# Patient Record
Sex: MALE | Race: WHITE | Employment: OTHER | URBAN - METROPOLITAN AREA
[De-identification: names, ages, dates, MRNs, and addresses within clinical notes are randomized per-mention and may not be internally consistent; named-entity substitution may affect disease eponyms.]

---

## 2017-12-18 ENCOUNTER — HOSPITAL ENCOUNTER (OUTPATIENT)
Dept: GENERAL RADIOLOGY | Age: 57
Discharge: HOME | End: 2017-12-18
Payer: COMMERCIAL

## 2017-12-18 ENCOUNTER — HOSPITAL ENCOUNTER (OUTPATIENT)
Dept: PREADMISSION TESTING | Age: 57
Discharge: HOME | End: 2017-12-18
Payer: COMMERCIAL

## 2017-12-18 DIAGNOSIS — Z01.818 PREOP TESTING: ICD-10-CM

## 2017-12-18 LAB
ABO + RH BLD: NORMAL
ALBUMIN SERPL-MCNC: 3.4 G/DL (ref 3.4–5)
ALBUMIN/GLOB SERPL: 1.2 {RATIO} (ref 0.8–1.7)
ALP SERPL-CCNC: 83 U/L (ref 45–117)
ALT SERPL-CCNC: 29 U/L (ref 16–61)
ANION GAP SERPL CALC-SCNC: 9 MMOL/L (ref 3–18)
APPEARANCE UR: CLEAR
APTT PPP: 25.7 SEC (ref 23–36.4)
AST SERPL-CCNC: 21 U/L (ref 15–37)
ATRIAL RATE: 77 BPM
BASOPHILS # BLD: 0 K/UL (ref 0–0.06)
BASOPHILS NFR BLD: 0 % (ref 0–2)
BILIRUB SERPL-MCNC: 0.4 MG/DL (ref 0.2–1)
BILIRUB UR QL: NEGATIVE
BLOOD GROUP ANTIBODIES SERPL: NORMAL
BUN SERPL-MCNC: 7 MG/DL (ref 7–18)
BUN/CREAT SERPL: 7 (ref 12–20)
CALCIUM SERPL-MCNC: 8.3 MG/DL (ref 8.5–10.1)
CALCULATED P AXIS, ECG09: 64 DEGREES
CALCULATED R AXIS, ECG10: 51 DEGREES
CALCULATED T AXIS, ECG11: 49 DEGREES
CHLORIDE SERPL-SCNC: 106 MMOL/L (ref 100–108)
CO2 SERPL-SCNC: 27 MMOL/L (ref 21–32)
COLOR UR: YELLOW
CREAT SERPL-MCNC: 0.95 MG/DL (ref 0.6–1.3)
DIAGNOSIS, 93000: NORMAL
DIFFERENTIAL METHOD BLD: ABNORMAL
EOSINOPHIL # BLD: 0 K/UL (ref 0–0.4)
EOSINOPHIL NFR BLD: 0 % (ref 0–5)
ERYTHROCYTE [DISTWIDTH] IN BLOOD BY AUTOMATED COUNT: 13.2 % (ref 11.6–14.5)
GLOBULIN SER CALC-MCNC: 2.9 G/DL (ref 2–4)
GLUCOSE SERPL-MCNC: 98 MG/DL (ref 74–99)
GLUCOSE UR STRIP.AUTO-MCNC: NEGATIVE MG/DL
HCT VFR BLD AUTO: 44.3 % (ref 36–48)
HGB BLD-MCNC: 14.2 G/DL (ref 13–16)
HGB UR QL STRIP: NEGATIVE
INR PPP: 0.9 (ref 0.8–1.2)
KETONES UR QL STRIP.AUTO: NEGATIVE MG/DL
LEUKOCYTE ESTERASE UR QL STRIP.AUTO: NEGATIVE
LYMPHOCYTES # BLD: 1.1 K/UL (ref 0.9–3.6)
LYMPHOCYTES NFR BLD: 12 % (ref 21–52)
MCH RBC QN AUTO: 30.1 PG (ref 24–34)
MCHC RBC AUTO-ENTMCNC: 32.1 G/DL (ref 31–37)
MCV RBC AUTO: 93.9 FL (ref 74–97)
MONOCYTES # BLD: 0.6 K/UL (ref 0.05–1.2)
MONOCYTES NFR BLD: 6 % (ref 3–10)
NEUTS SEG # BLD: 7.7 K/UL (ref 1.8–8)
NEUTS SEG NFR BLD: 82 % (ref 40–73)
NITRITE UR QL STRIP.AUTO: NEGATIVE
P-R INTERVAL, ECG05: 150 MS
PH UR STRIP: 7 [PH] (ref 5–8)
PLATELET # BLD AUTO: 207 K/UL (ref 135–420)
PMV BLD AUTO: 9.2 FL (ref 9.2–11.8)
POTASSIUM SERPL-SCNC: 3.8 MMOL/L (ref 3.5–5.5)
PROT SERPL-MCNC: 6.3 G/DL (ref 6.4–8.2)
PROT UR STRIP-MCNC: NEGATIVE MG/DL
PROTHROMBIN TIME: 12 SEC (ref 11.5–15.2)
Q-T INTERVAL, ECG07: 346 MS
QRS DURATION, ECG06: 90 MS
QTC CALCULATION (BEZET), ECG08: 391 MS
RBC # BLD AUTO: 4.72 M/UL (ref 4.7–5.5)
SODIUM SERPL-SCNC: 142 MMOL/L (ref 136–145)
SP GR UR REFRACTOMETRY: 1.02 (ref 1–1.03)
SPECIMEN EXP DATE BLD: NORMAL
UROBILINOGEN UR QL STRIP.AUTO: 0.2 EU/DL (ref 0.2–1)
VENTRICULAR RATE, ECG03: 77 BPM
WBC # BLD AUTO: 9.5 K/UL (ref 4.6–13.2)

## 2017-12-18 PROCEDURE — 87086 URINE CULTURE/COLONY COUNT: CPT | Performed by: ORTHOPAEDIC SURGERY

## 2017-12-18 PROCEDURE — 71020 XR CHEST PA LAT: CPT

## 2017-12-18 PROCEDURE — 80053 COMPREHEN METABOLIC PANEL: CPT | Performed by: ORTHOPAEDIC SURGERY

## 2017-12-18 PROCEDURE — 36415 COLL VENOUS BLD VENIPUNCTURE: CPT | Performed by: ORTHOPAEDIC SURGERY

## 2017-12-18 PROCEDURE — 81003 URINALYSIS AUTO W/O SCOPE: CPT | Performed by: ORTHOPAEDIC SURGERY

## 2017-12-18 PROCEDURE — 86900 BLOOD TYPING SEROLOGIC ABO: CPT | Performed by: ORTHOPAEDIC SURGERY

## 2017-12-18 PROCEDURE — 85610 PROTHROMBIN TIME: CPT | Performed by: ORTHOPAEDIC SURGERY

## 2017-12-18 PROCEDURE — 85730 THROMBOPLASTIN TIME PARTIAL: CPT | Performed by: ORTHOPAEDIC SURGERY

## 2017-12-18 PROCEDURE — 85025 COMPLETE CBC W/AUTO DIFF WBC: CPT | Performed by: ORTHOPAEDIC SURGERY

## 2017-12-18 PROCEDURE — 93005 ELECTROCARDIOGRAM TRACING: CPT

## 2017-12-18 RX ORDER — ROSUVASTATIN CALCIUM 20 MG/1
20 TABLET, COATED ORAL
COMMUNITY

## 2017-12-18 NOTE — PROGRESS NOTES
Yo Brianna attended the Joint Replacement Pre-Operative class on 12/18/17. Topics discussed included surgery preparation, what to expect the day of surgery, medications, physical and occupational therapy, and discharge planning. It was discussed that this is considered an elective surgery and that prior to the surgery they need to make decisions such as arranging for help at home once they are discharged. He was given a knee patient education notebook to take home. Opportunity was given to ask questions and phone number of the Orthopaedic Nurse Clinician was given for any questions or concerns that may arise later.

## 2017-12-19 LAB
BACTERIA SPEC CULT: NORMAL
SERVICE CMNT-IMP: NORMAL

## 2017-12-21 ENCOUNTER — ANESTHESIA EVENT (OUTPATIENT)
Dept: SURGERY | Age: 57
DRG: 470 | End: 2017-12-21
Payer: COMMERCIAL

## 2017-12-22 ENCOUNTER — APPOINTMENT (OUTPATIENT)
Dept: GENERAL RADIOLOGY | Age: 57
DRG: 470 | End: 2017-12-22
Attending: ORTHOPAEDIC SURGERY
Payer: COMMERCIAL

## 2017-12-22 ENCOUNTER — ANESTHESIA (OUTPATIENT)
Dept: SURGERY | Age: 57
DRG: 470 | End: 2017-12-22
Payer: COMMERCIAL

## 2017-12-22 ENCOUNTER — HOSPITAL ENCOUNTER (INPATIENT)
Age: 57
LOS: 2 days | Discharge: HOME HEALTH | DRG: 470 | End: 2017-12-24
Attending: ORTHOPAEDIC SURGERY | Admitting: ORTHOPAEDIC SURGERY
Payer: COMMERCIAL

## 2017-12-22 DIAGNOSIS — M17.32 POST-TRAUMATIC OSTEOARTHRITIS OF LEFT KNEE: Primary | ICD-10-CM

## 2017-12-22 PROBLEM — M17.9 KNEE OSTEOARTHRITIS: Status: ACTIVE | Noted: 2017-12-22

## 2017-12-22 LAB
ANION GAP SERPL CALC-SCNC: 7 MMOL/L (ref 3–18)
BUN SERPL-MCNC: 8 MG/DL (ref 7–18)
BUN/CREAT SERPL: 8 (ref 12–20)
CALCIUM SERPL-MCNC: 8.1 MG/DL (ref 8.5–10.1)
CHLORIDE SERPL-SCNC: 109 MMOL/L (ref 100–108)
CO2 SERPL-SCNC: 27 MMOL/L (ref 21–32)
CREAT SERPL-MCNC: 1.04 MG/DL (ref 0.6–1.3)
GLUCOSE SERPL-MCNC: 94 MG/DL (ref 74–99)
POTASSIUM SERPL-SCNC: 4.4 MMOL/L (ref 3.5–5.5)
SODIUM SERPL-SCNC: 143 MMOL/L (ref 136–145)

## 2017-12-22 PROCEDURE — 76942 ECHO GUIDE FOR BIOPSY: CPT | Performed by: ANESTHESIOLOGY

## 2017-12-22 PROCEDURE — 77030034479 HC ADH SKN CLSR PRINEO J&J -B: Performed by: ORTHOPAEDIC SURGERY

## 2017-12-22 PROCEDURE — 74011250636 HC RX REV CODE- 250/636

## 2017-12-22 PROCEDURE — 36415 COLL VENOUS BLD VENIPUNCTURE: CPT | Performed by: ORTHOPAEDIC SURGERY

## 2017-12-22 PROCEDURE — 77030026438 HC STYL ET INTUB CARD -A: Performed by: ANESTHESIOLOGY

## 2017-12-22 PROCEDURE — C1713 ANCHOR/SCREW BN/BN,TIS/BN: HCPCS | Performed by: ORTHOPAEDIC SURGERY

## 2017-12-22 PROCEDURE — 97110 THERAPEUTIC EXERCISES: CPT

## 2017-12-22 PROCEDURE — 73560 X-RAY EXAM OF KNEE 1 OR 2: CPT

## 2017-12-22 PROCEDURE — 77030013079 HC BLNKT BAIR HGGR 3M -A: Performed by: ORTHOPAEDIC SURGERY

## 2017-12-22 PROCEDURE — 74011000258 HC RX REV CODE- 258: Performed by: ORTHOPAEDIC SURGERY

## 2017-12-22 PROCEDURE — 3E0T3BZ INTRODUCTION OF ANESTHETIC AGENT INTO PERIPHERAL NERVES AND PLEXI, PERCUTANEOUS APPROACH: ICD-10-PCS | Performed by: ANESTHESIOLOGY

## 2017-12-22 PROCEDURE — C1776 JOINT DEVICE (IMPLANTABLE): HCPCS | Performed by: ORTHOPAEDIC SURGERY

## 2017-12-22 PROCEDURE — 97161 PT EVAL LOW COMPLEX 20 MIN: CPT

## 2017-12-22 PROCEDURE — 77030013079 HC BLNKT BAIR HGGR 3M -A: Performed by: ANESTHESIOLOGY

## 2017-12-22 PROCEDURE — 77030031139 HC SUT VCRL2 J&J -A: Performed by: ORTHOPAEDIC SURGERY

## 2017-12-22 PROCEDURE — 64450 NJX AA&/STRD OTHER PN/BRANCH: CPT | Performed by: ANESTHESIOLOGY

## 2017-12-22 PROCEDURE — 74011250637 HC RX REV CODE- 250/637: Performed by: ORTHOPAEDIC SURGERY

## 2017-12-22 PROCEDURE — 77030016544 HC BLD SAW RECIP1 STRY -B: Performed by: ORTHOPAEDIC SURGERY

## 2017-12-22 PROCEDURE — 74011250637 HC RX REV CODE- 250/637: Performed by: NURSE ANESTHETIST, CERTIFIED REGISTERED

## 2017-12-22 PROCEDURE — 77030018548 HC SUT ETHBND2 J&J -B: Performed by: ORTHOPAEDIC SURGERY

## 2017-12-22 PROCEDURE — 77030029494 HC CLD THER UNIT ICMN DJOR -B: Performed by: ORTHOPAEDIC SURGERY

## 2017-12-22 PROCEDURE — 77030020782 HC GWN BAIR PAWS FLX 3M -B: Performed by: ORTHOPAEDIC SURGERY

## 2017-12-22 PROCEDURE — 77030021370 HC WRP CLD THER ICMN DJOR -B: Performed by: ORTHOPAEDIC SURGERY

## 2017-12-22 PROCEDURE — 80048 BASIC METABOLIC PNL TOTAL CA: CPT | Performed by: ORTHOPAEDIC SURGERY

## 2017-12-22 PROCEDURE — 76060000035 HC ANESTHESIA 2 TO 2.5 HR: Performed by: ORTHOPAEDIC SURGERY

## 2017-12-22 PROCEDURE — 74011250636 HC RX REV CODE- 250/636: Performed by: NURSE ANESTHETIST, CERTIFIED REGISTERED

## 2017-12-22 PROCEDURE — 77030002933 HC SUT MCRYL J&J -A: Performed by: ORTHOPAEDIC SURGERY

## 2017-12-22 PROCEDURE — 77030012890: Performed by: ORTHOPAEDIC SURGERY

## 2017-12-22 PROCEDURE — 76210000006 HC OR PH I REC 0.5 TO 1 HR: Performed by: ORTHOPAEDIC SURGERY

## 2017-12-22 PROCEDURE — 76010000171 HC OR TIME 2 TO 2.5 HR INTENSV-TIER 1: Performed by: ORTHOPAEDIC SURGERY

## 2017-12-22 PROCEDURE — 0SRD0J9 REPLACEMENT OF LEFT KNEE JOINT WITH SYNTHETIC SUBSTITUTE, CEMENTED, OPEN APPROACH: ICD-10-PCS | Performed by: ORTHOPAEDIC SURGERY

## 2017-12-22 PROCEDURE — 77030003601 HC NDL NRV BLK BBMI -A: Performed by: ORTHOPAEDIC SURGERY

## 2017-12-22 PROCEDURE — 77030012406 HC DRN WND PENRS BARD -A: Performed by: ORTHOPAEDIC SURGERY

## 2017-12-22 PROCEDURE — 77030013708 HC HNDPC SUC IRR PULS STRY –B: Performed by: ORTHOPAEDIC SURGERY

## 2017-12-22 PROCEDURE — 74011250636 HC RX REV CODE- 250/636: Performed by: ORTHOPAEDIC SURGERY

## 2017-12-22 PROCEDURE — 77030011640 HC PAD GRND REM COVD -A: Performed by: ORTHOPAEDIC SURGERY

## 2017-12-22 PROCEDURE — 77030034850: Performed by: ORTHOPAEDIC SURGERY

## 2017-12-22 PROCEDURE — 77030019605: Performed by: ORTHOPAEDIC SURGERY

## 2017-12-22 PROCEDURE — 77030027138 HC INCENT SPIROMETER -A

## 2017-12-22 PROCEDURE — 65270000029 HC RM PRIVATE

## 2017-12-22 PROCEDURE — 77030008683 HC TU ET CUF COVD -A: Performed by: ANESTHESIOLOGY

## 2017-12-22 PROCEDURE — 77030032490 HC SLV COMPR SCD KNE COVD -B: Performed by: ORTHOPAEDIC SURGERY

## 2017-12-22 PROCEDURE — C9290 INJ, BUPIVACAINE LIPOSOME: HCPCS | Performed by: ORTHOPAEDIC SURGERY

## 2017-12-22 PROCEDURE — 74011000250 HC RX REV CODE- 250: Performed by: ORTHOPAEDIC SURGERY

## 2017-12-22 PROCEDURE — 97530 THERAPEUTIC ACTIVITIES: CPT

## 2017-12-22 PROCEDURE — 77030018836 HC SOL IRR NACL ICUM -A: Performed by: ORTHOPAEDIC SURGERY

## 2017-12-22 PROCEDURE — 74011000250 HC RX REV CODE- 250

## 2017-12-22 PROCEDURE — 77030012935 HC DRSG AQUACEL BMS -B: Performed by: ORTHOPAEDIC SURGERY

## 2017-12-22 PROCEDURE — 77030000032 HC CUF TRNQT ZIMM -B: Performed by: ORTHOPAEDIC SURGERY

## 2017-12-22 PROCEDURE — 77010033678 HC OXYGEN DAILY

## 2017-12-22 PROCEDURE — 77030025371 HC PIN FIX QD SIG J&J -D: Performed by: ORTHOPAEDIC SURGERY

## 2017-12-22 PROCEDURE — 77030010785: Performed by: ORTHOPAEDIC SURGERY

## 2017-12-22 PROCEDURE — 74011000258 HC RX REV CODE- 258

## 2017-12-22 PROCEDURE — 74011000258 HC RX REV CODE- 258: Performed by: NURSE ANESTHETIST, CERTIFIED REGISTERED

## 2017-12-22 DEVICE — IMPLANTABLE DEVICE: Type: IMPLANTABLE DEVICE | Site: KNEE | Status: FUNCTIONAL

## 2017-12-22 DEVICE — INSERT TIB RP FEM KNEE CEM: Type: IMPLANTABLE DEVICE | Site: KNEE | Status: FUNCTIONAL

## 2017-12-22 DEVICE — ATTUNE PATELLA MEDIALIZED ANATOMIC 41MM CEMENTED AOX
Type: IMPLANTABLE DEVICE | Site: KNEE | Status: FUNCTIONAL
Brand: ATTUNE

## 2017-12-22 DEVICE — ATTUNE KNEE SYSTEM FEMORAL POSTERIOR STABILIZED SIZE 8 LEFT CEMENTED
Type: IMPLANTABLE DEVICE | Site: KNEE | Status: FUNCTIONAL
Brand: ATTUNE

## 2017-12-22 DEVICE — ATTUNE KNEE SYSTEM TIBIAL BASE FIXED BEARING SIZE 7 CEMENTED
Type: IMPLANTABLE DEVICE | Site: KNEE | Status: FUNCTIONAL
Brand: ATTUNE

## 2017-12-22 DEVICE — SMARTSET GMV HIGH PERFORMANCE GENTAMICIN MEDIUM VISCOSITY BONE CEMENT 40G
Type: IMPLANTABLE DEVICE | Site: KNEE | Status: FUNCTIONAL
Brand: SMARTSET

## 2017-12-22 RX ORDER — NALOXONE HYDROCHLORIDE 0.4 MG/ML
0.4 INJECTION, SOLUTION INTRAMUSCULAR; INTRAVENOUS; SUBCUTANEOUS AS NEEDED
Status: DISCONTINUED | OUTPATIENT
Start: 2017-12-22 | End: 2017-12-24 | Stop reason: HOSPADM

## 2017-12-22 RX ORDER — OXYCODONE HYDROCHLORIDE 5 MG/1
5-15 TABLET ORAL
Status: DISCONTINUED | OUTPATIENT
Start: 2017-12-22 | End: 2017-12-24 | Stop reason: HOSPADM

## 2017-12-22 RX ORDER — CELECOXIB 100 MG/1
200 CAPSULE ORAL 2 TIMES DAILY
Status: DISCONTINUED | OUTPATIENT
Start: 2017-12-22 | End: 2017-12-24 | Stop reason: HOSPADM

## 2017-12-22 RX ORDER — ACETAMINOPHEN 500 MG
1000 TABLET ORAL EVERY 8 HOURS
Status: DISCONTINUED | OUTPATIENT
Start: 2017-12-22 | End: 2017-12-24 | Stop reason: HOSPADM

## 2017-12-22 RX ORDER — MIDAZOLAM HYDROCHLORIDE 1 MG/ML
INJECTION, SOLUTION INTRAMUSCULAR; INTRAVENOUS AS NEEDED
Status: DISCONTINUED | OUTPATIENT
Start: 2017-12-22 | End: 2017-12-22 | Stop reason: HOSPADM

## 2017-12-22 RX ORDER — SODIUM CHLORIDE 0.9 % (FLUSH) 0.9 %
5-10 SYRINGE (ML) INJECTION AS NEEDED
Status: DISCONTINUED | OUTPATIENT
Start: 2017-12-22 | End: 2017-12-24 | Stop reason: HOSPADM

## 2017-12-22 RX ORDER — LIDOCAINE HYDROCHLORIDE 20 MG/ML
INJECTION, SOLUTION EPIDURAL; INFILTRATION; INTRACAUDAL; PERINEURAL AS NEEDED
Status: DISCONTINUED | OUTPATIENT
Start: 2017-12-22 | End: 2017-12-22 | Stop reason: HOSPADM

## 2017-12-22 RX ORDER — DEXAMETHASONE SODIUM PHOSPHATE 4 MG/ML
INJECTION, SOLUTION INTRA-ARTICULAR; INTRALESIONAL; INTRAMUSCULAR; INTRAVENOUS; SOFT TISSUE AS NEEDED
Status: DISCONTINUED | OUTPATIENT
Start: 2017-12-22 | End: 2017-12-22 | Stop reason: HOSPADM

## 2017-12-22 RX ORDER — SODIUM CHLORIDE 9 MG/ML
125 INJECTION, SOLUTION INTRAVENOUS CONTINUOUS
Status: DISCONTINUED | OUTPATIENT
Start: 2017-12-22 | End: 2017-12-24 | Stop reason: HOSPADM

## 2017-12-22 RX ORDER — FAMOTIDINE 20 MG/1
20 TABLET, FILM COATED ORAL ONCE
Status: COMPLETED | OUTPATIENT
Start: 2017-12-22 | End: 2017-12-22

## 2017-12-22 RX ORDER — NEOSTIGMINE METHYLSULFATE 5 MG/5 ML
SYRINGE (ML) INTRAVENOUS AS NEEDED
Status: DISCONTINUED | OUTPATIENT
Start: 2017-12-22 | End: 2017-12-22 | Stop reason: HOSPADM

## 2017-12-22 RX ORDER — FENTANYL CITRATE 50 UG/ML
50 INJECTION, SOLUTION INTRAMUSCULAR; INTRAVENOUS AS NEEDED
Status: DISCONTINUED | OUTPATIENT
Start: 2017-12-22 | End: 2017-12-22 | Stop reason: HOSPADM

## 2017-12-22 RX ORDER — SODIUM CHLORIDE 0.9 % (FLUSH) 0.9 %
5-10 SYRINGE (ML) INJECTION AS NEEDED
Status: DISCONTINUED | OUTPATIENT
Start: 2017-12-22 | End: 2017-12-22 | Stop reason: HOSPADM

## 2017-12-22 RX ORDER — ROSUVASTATIN CALCIUM 20 MG/1
20 TABLET, COATED ORAL
Status: DISCONTINUED | OUTPATIENT
Start: 2017-12-22 | End: 2017-12-24 | Stop reason: HOSPADM

## 2017-12-22 RX ORDER — ROPIVACAINE HYDROCHLORIDE 2 MG/ML
60 INJECTION, SOLUTION EPIDURAL; INFILTRATION; PERINEURAL
Status: COMPLETED | OUTPATIENT
Start: 2017-12-22 | End: 2017-12-22

## 2017-12-22 RX ORDER — MIDAZOLAM HYDROCHLORIDE 1 MG/ML
2 INJECTION, SOLUTION INTRAMUSCULAR; INTRAVENOUS ONCE
Status: COMPLETED | OUTPATIENT
Start: 2017-12-22 | End: 2017-12-22

## 2017-12-22 RX ORDER — FACIAL-BODY WIPES
10 EACH TOPICAL DAILY PRN
Status: DISCONTINUED | OUTPATIENT
Start: 2017-12-22 | End: 2017-12-24 | Stop reason: HOSPADM

## 2017-12-22 RX ORDER — SODIUM CHLORIDE, SODIUM LACTATE, POTASSIUM CHLORIDE, CALCIUM CHLORIDE 600; 310; 30; 20 MG/100ML; MG/100ML; MG/100ML; MG/100ML
75 INJECTION, SOLUTION INTRAVENOUS CONTINUOUS
Status: DISCONTINUED | OUTPATIENT
Start: 2017-12-22 | End: 2017-12-22 | Stop reason: HOSPADM

## 2017-12-22 RX ORDER — GLYCOPYRROLATE 0.2 MG/ML
INJECTION INTRAMUSCULAR; INTRAVENOUS AS NEEDED
Status: DISCONTINUED | OUTPATIENT
Start: 2017-12-22 | End: 2017-12-22 | Stop reason: HOSPADM

## 2017-12-22 RX ORDER — FENTANYL CITRATE 50 UG/ML
INJECTION, SOLUTION INTRAMUSCULAR; INTRAVENOUS AS NEEDED
Status: DISCONTINUED | OUTPATIENT
Start: 2017-12-22 | End: 2017-12-22 | Stop reason: HOSPADM

## 2017-12-22 RX ORDER — HYDROMORPHONE HYDROCHLORIDE 1 MG/ML
INJECTION, SOLUTION INTRAMUSCULAR; INTRAVENOUS; SUBCUTANEOUS AS NEEDED
Status: DISCONTINUED | OUTPATIENT
Start: 2017-12-22 | End: 2017-12-22 | Stop reason: HOSPADM

## 2017-12-22 RX ORDER — ROCURONIUM BROMIDE 10 MG/ML
INJECTION, SOLUTION INTRAVENOUS AS NEEDED
Status: DISCONTINUED | OUTPATIENT
Start: 2017-12-22 | End: 2017-12-22 | Stop reason: HOSPADM

## 2017-12-22 RX ORDER — POLYETHYLENE GLYCOL 3350 17 G/17G
17 POWDER, FOR SOLUTION ORAL DAILY
Status: DISCONTINUED | OUTPATIENT
Start: 2017-12-23 | End: 2017-12-24 | Stop reason: HOSPADM

## 2017-12-22 RX ORDER — SODIUM CHLORIDE 0.9 % (FLUSH) 0.9 %
5-10 SYRINGE (ML) INJECTION EVERY 8 HOURS
Status: DISCONTINUED | OUTPATIENT
Start: 2017-12-22 | End: 2017-12-22 | Stop reason: HOSPADM

## 2017-12-22 RX ORDER — PROPOFOL 10 MG/ML
INJECTION, EMULSION INTRAVENOUS AS NEEDED
Status: DISCONTINUED | OUTPATIENT
Start: 2017-12-22 | End: 2017-12-22 | Stop reason: HOSPADM

## 2017-12-22 RX ORDER — NALOXONE HYDROCHLORIDE 0.4 MG/ML
0.1 INJECTION, SOLUTION INTRAMUSCULAR; INTRAVENOUS; SUBCUTANEOUS ONCE
Status: DISCONTINUED | OUTPATIENT
Start: 2017-12-22 | End: 2017-12-22 | Stop reason: HOSPADM

## 2017-12-22 RX ORDER — SODIUM CHLORIDE 0.9 % (FLUSH) 0.9 %
5-10 SYRINGE (ML) INJECTION EVERY 8 HOURS
Status: DISCONTINUED | OUTPATIENT
Start: 2017-12-22 | End: 2017-12-24 | Stop reason: HOSPADM

## 2017-12-22 RX ORDER — DIPHENHYDRAMINE HCL 25 MG
25 CAPSULE ORAL
Status: DISCONTINUED | OUTPATIENT
Start: 2017-12-22 | End: 2017-12-24 | Stop reason: HOSPADM

## 2017-12-22 RX ORDER — FENTANYL CITRATE 50 UG/ML
100 INJECTION, SOLUTION INTRAMUSCULAR; INTRAVENOUS ONCE
Status: COMPLETED | OUTPATIENT
Start: 2017-12-22 | End: 2017-12-22

## 2017-12-22 RX ORDER — SUCCINYLCHOLINE CHLORIDE 20 MG/ML
INJECTION INTRAMUSCULAR; INTRAVENOUS AS NEEDED
Status: DISCONTINUED | OUTPATIENT
Start: 2017-12-22 | End: 2017-12-22 | Stop reason: HOSPADM

## 2017-12-22 RX ORDER — ONDANSETRON 2 MG/ML
4-8 INJECTION INTRAMUSCULAR; INTRAVENOUS
Status: DISCONTINUED | OUTPATIENT
Start: 2017-12-22 | End: 2017-12-24 | Stop reason: HOSPADM

## 2017-12-22 RX ADMIN — ROPIVACAINE HYDROCHLORIDE 60 MG: 2 INJECTION, SOLUTION EPIDURAL; INFILTRATION at 09:48

## 2017-12-22 RX ADMIN — Medication 10 ML: at 21:47

## 2017-12-22 RX ADMIN — DEXAMETHASONE SODIUM PHOSPHATE 4 MG: 4 INJECTION, SOLUTION INTRA-ARTICULAR; INTRALESIONAL; INTRAMUSCULAR; INTRAVENOUS; SOFT TISSUE at 10:28

## 2017-12-22 RX ADMIN — SODIUM CHLORIDE 1000 MG: 900 INJECTION, SOLUTION INTRAVENOUS at 09:18

## 2017-12-22 RX ADMIN — MIDAZOLAM HYDROCHLORIDE 2 MG: 1 INJECTION, SOLUTION INTRAMUSCULAR; INTRAVENOUS at 09:42

## 2017-12-22 RX ADMIN — OXYCODONE HYDROCHLORIDE 5 MG: 5 TABLET ORAL at 17:14

## 2017-12-22 RX ADMIN — PROPOFOL 180 MG: 10 INJECTION, EMULSION INTRAVENOUS at 10:17

## 2017-12-22 RX ADMIN — FENTANYL CITRATE 100 MCG: 50 INJECTION INTRAMUSCULAR; INTRAVENOUS at 09:42

## 2017-12-22 RX ADMIN — CELECOXIB 200 MG: 100 CAPSULE ORAL at 17:14

## 2017-12-22 RX ADMIN — SODIUM CHLORIDE, SODIUM LACTATE, POTASSIUM CHLORIDE, AND CALCIUM CHLORIDE 75 ML/HR: 600; 310; 30; 20 INJECTION, SOLUTION INTRAVENOUS at 09:09

## 2017-12-22 RX ADMIN — GLYCOPYRROLATE 0.6 MG: 0.2 INJECTION INTRAMUSCULAR; INTRAVENOUS at 12:29

## 2017-12-22 RX ADMIN — SODIUM CHLORIDE 125 ML/HR: 900 INJECTION, SOLUTION INTRAVENOUS at 21:53

## 2017-12-22 RX ADMIN — TRANEXAMIC ACID 1 G: 100 INJECTION, SOLUTION INTRAVENOUS at 10:19

## 2017-12-22 RX ADMIN — RIVAROXABAN 10 MG: 10 TABLET, FILM COATED ORAL at 17:14

## 2017-12-22 RX ADMIN — HYDROMORPHONE HYDROCHLORIDE 0.5 MG: 1 INJECTION, SOLUTION INTRAMUSCULAR; INTRAVENOUS; SUBCUTANEOUS at 12:27

## 2017-12-22 RX ADMIN — SODIUM CHLORIDE 1 G: 900 INJECTION, SOLUTION INTRAVENOUS at 09:20

## 2017-12-22 RX ADMIN — Medication 10 ML: at 15:02

## 2017-12-22 RX ADMIN — TRANEXAMIC ACID 1 G: 100 INJECTION, SOLUTION INTRAVENOUS at 11:55

## 2017-12-22 RX ADMIN — ROCURONIUM BROMIDE 35 MG: 10 INJECTION, SOLUTION INTRAVENOUS at 10:30

## 2017-12-22 RX ADMIN — ROCURONIUM BROMIDE 5 MG: 10 INJECTION, SOLUTION INTRAVENOUS at 10:17

## 2017-12-22 RX ADMIN — OXYCODONE HYDROCHLORIDE 5 MG: 5 TABLET ORAL at 21:51

## 2017-12-22 RX ADMIN — ACETAMINOPHEN 1000 MG: 500 TABLET ORAL at 21:47

## 2017-12-22 RX ADMIN — SUCCINYLCHOLINE CHLORIDE 120 MG: 20 INJECTION INTRAMUSCULAR; INTRAVENOUS at 10:17

## 2017-12-22 RX ADMIN — Medication 3 MG: at 12:29

## 2017-12-22 RX ADMIN — FAMOTIDINE 20 MG: 20 TABLET, FILM COATED ORAL at 09:09

## 2017-12-22 RX ADMIN — PROMETHAZINE HYDROCHLORIDE 12.5 MG: 25 INJECTION INTRAMUSCULAR; INTRAVENOUS at 13:03

## 2017-12-22 RX ADMIN — PROPOFOL 20 MG: 10 INJECTION, EMULSION INTRAVENOUS at 11:15

## 2017-12-22 RX ADMIN — SODIUM CHLORIDE 900 MG: 900 INJECTION, SOLUTION INTRAVENOUS at 17:14

## 2017-12-22 RX ADMIN — HYDROMORPHONE HYDROCHLORIDE 0.5 MG: 1 INJECTION, SOLUTION INTRAMUSCULAR; INTRAVENOUS; SUBCUTANEOUS at 12:02

## 2017-12-22 RX ADMIN — GLYCOPYRROLATE 0.1 MG: 0.2 INJECTION INTRAMUSCULAR; INTRAVENOUS at 10:10

## 2017-12-22 RX ADMIN — ACETAMINOPHEN 1000 MG: 500 TABLET ORAL at 15:02

## 2017-12-22 RX ADMIN — LIDOCAINE HYDROCHLORIDE 40 MG: 20 INJECTION, SOLUTION EPIDURAL; INFILTRATION; INTRACAUDAL; PERINEURAL at 10:17

## 2017-12-22 RX ADMIN — SODIUM CHLORIDE 125 ML/HR: 900 INJECTION, SOLUTION INTRAVENOUS at 14:44

## 2017-12-22 RX ADMIN — SODIUM CHLORIDE, SODIUM LACTATE, POTASSIUM CHLORIDE, AND CALCIUM CHLORIDE: 600; 310; 30; 20 INJECTION, SOLUTION INTRAVENOUS at 11:45

## 2017-12-22 RX ADMIN — MIDAZOLAM HYDROCHLORIDE 1 MG: 1 INJECTION, SOLUTION INTRAMUSCULAR; INTRAVENOUS at 10:10

## 2017-12-22 RX ADMIN — FENTANYL CITRATE 100 MCG: 50 INJECTION, SOLUTION INTRAMUSCULAR; INTRAVENOUS at 10:17

## 2017-12-22 NOTE — PROGRESS NOTES
Problem: Mobility Impaired (Adult and Pediatric)  Goal: *Acute Goals and Plan of Care (Insert Text)  Physical Therapy Goals  Initiated 12/22/2017 and to be accomplished within 7 day(s)  1. Patient will move from supine to sit and sit to supine  in bed with modified independence. 2.  Patient will transfer from bed to chair and chair to bed with modified independence using the least restrictive device. 3.  Patient will perform sit to stand with modified independence. 4.  Patient will ambulate with supervision/set-up for 300 feet with the least restrictive device. 5.  Patient will ascend/descend 4 stairs with 2 handrail(s) with minimal assistance/contact guard assist.  physical Therapy EVALUATION    Patient: Chela Yadav (20 y.o. male)  Date: 12/22/2017  Primary Diagnosis: Post-traumatic osteoarthritis of left knee [M17.32]  Procedure(s) (LRB):  LEFT TOTAL KNEE REPLACEMENT/DEPUY/2 SA'S/FEMORAL NERVE BLOCK (Left) Day of Surgery   Precautions: fall, WBAT       ASSESSMENT :  Based on the objective data described below, the patient presents with decreased strength, balance and L knee ROM resulting in decreased independence with functional mobility. Pt performed supine LE exercises prior to initiating mobility. Pt was able to SLR without knee lag on the left. Pt performed supine to sit transfer with supervision. Pt stood from the edge of the bed with CGA to the RW. Pt ambulated 80 feet with RW, CGA and decreased step length with an antalgic gait pattern. Pt was left sitting up in the recliner with SCDs on, towel roll under heel, ice machine on and needs in reach. Patient will benefit from skilled intervention to address the above impairments.   Patients rehabilitation potential is considered to be Good  Factors which may influence rehabilitation potential include:   [x]         None noted  []         Mental ability/status  []         Medical condition  []         Home/family situation and support systems  []         Safety awareness  []         Pain tolerance/management  []         Other:      PLAN :  Recommendations and Planned Interventions:  [x]           Bed Mobility Training             [x]    Neuromuscular Re-Education  [x]           Transfer Training                   []    Orthotic/Prosthetic Training  [x]           Gait Training                          [x]    Modalities  [x]           Therapeutic Exercises          [x]    Edema Management/Control  [x]           Therapeutic Activities            [x]    Patient and Family Training/Education  []           Other (comment):    Frequency/Duration: Patient will be followed by physical therapy 1-2 times per day/4-7 days per week to address goals. Discharge Recommendations: Home Health  Further Equipment Recommendations for Discharge: rolling walker     G-CODES      Mobility  Current  CJ= 20-39%   Goal  CI= 1-19%. The severity rating is based on the Level of Assistance required for Functional Mobility and ADLs. Eval Complexity: History: MEDIUM  Complexity : 1-2 comorbidities / personal factors will impact the outcome/ POC Exam:LOW Complexity : 1-2 Standardized tests and measures addressing body structure, function, activity limitation and / or participation in recreation  Presentation: MEDIUM Complexity : Evolving with changing characteristics  Clinical Decision Making:Low Complexity , Overall Complexity:LOW     SUBJECTIVE:   Patient stated I'm ready to get moving so that I can go home tomorrow.     OBJECTIVE DATA SUMMARY:     Past Medical History:   Diagnosis Date    ADD (attention deficit disorder)     Anxiety     Chronic venous insufficiency     Erectile dysfunction     Hypercholesterolemia      Past Surgical History:   Procedure Laterality Date    HX HEENT      Vocal cord- removal of calcium deposits    HX HERNIA REPAIR      as a toddler    HX OTHER SURGICAL      nose / sinus surgery 2000    HX OTHER SURGICAL      Lower arm surgery 1998    HX OTHER SURGICAL      Hand surgery 1998    HX OTHER SURGICAL Bilateral     eye surgery    HX OTHER SURGICAL      Aortic bypass graft - left leg     Prior Level of Function/Home Situation: independent with mobility without an assistive device, son will be home to assist him for 10 days  Home Situation  Home Environment: Private residence  # Steps to Enter: 4  One/Two Story Residence: One story  Living Alone: No  Support Systems: Family member(s), Friends \ neighbors  Patient Expects to be Discharged to[de-identified] Private residence  Current DME Used/Available at Home: Crutches, [employer]  Critical Behavior:   calm and cooperative, motivated   Strength:    Strength: Within functional limits (able to SLR on L without knee lag)   Tone & Sensation:   Tone: Normal   Sensation: Intact   Range Of Motion:  AROM: Within functional limits (except L knee)      Functional Mobility:  Bed Mobility:  Supine to Sit: Supervision  Transfers:  Sit to Stand: Contact guard assistance  Stand to Sit: Contact guard assistance  Bed to Chair: Contact guard assistance        Balance:   Sitting: Intact  Standing: With support  Standing - Static: Fair  Standing - Dynamic : Fair  Ambulation/Gait Training:  Distance (ft): 80 Feet (ft)  Assistive Device: Walker, rolling  Ambulation - Level of Assistance: Contact guard assistance  Gait Abnormalities: Antalgic;Decreased step clearance; Step to gait  Left Side Weight Bearing: As tolerated    Therapeutic Exercises: Ankle pumps, quad sets, heel slides, SLR  Pain:  Pt reports 5/10 pain or discomfort prior to treatment.    Pt reports 5/10 pain or discomfort post treatment. Activity Tolerance:   Fair+  Please refer to the flowsheet for vital signs taken during this treatment.   After treatment:   [x]         Patient left in no apparent distress sitting up in chair  []         Patient left in no apparent distress in bed  [x]         Call bell left within reach  [x]         Nursing notified  [] Caregiver present  []         Bed alarm activated    COMMUNICATION/EDUCATION:   [x]         Fall prevention education was provided and the patient/caregiver indicated understanding. [x]         Patient/family have participated as able in goal setting and plan of care. [x]         Patient/family agree to work toward stated goals and plan of care. []         Patient understands intent and goals of therapy, but is neutral about his/her participation. []         Patient is unable to participate in goal setting and plan of care.   Educated patient on exercises, transfer techniques and calling for assistance to get up    Thank you for this referral.  Marie Kumar, PT   Time Calculation: 42 mins

## 2017-12-22 NOTE — ANESTHESIA POSTPROCEDURE EVALUATION
Post-Anesthesia Evaluation and Assessment    Patient: Rebecca Ayers MRN: [id number]  SSN: [id number]    YOB: 1960  Age: 59 y.o. Sex: male       Cardiovascular Function/Vital Signs  Visit Vitals    /77    Pulse 87    Temp 36.6 °C (97.9 °F)    Resp 14    Ht 5' 10\" (1.778 m)    Wt 85.8 kg (189 lb 3.2 oz)    SpO2 99%    BMI 27.15 kg/m2       Patient is status post general anesthesia for Procedure(s):  LEFT TOTAL KNEE REPLACEMENT/DEPUY/2 SA'S/FEMORAL NERVE BLOCK. Nausea/Vomiting: None    Postoperative hydration reviewed and adequate. Pain:  Pain Scale 1: Numeric (0 - 10) (12/22/17 1334)  Pain Intensity 1: 0 (12/22/17 1334)   Managed    Neurological Status:   Neuro (WDL): Within Defined Limits (12/22/17 1244)   At baseline    Mental Status and Level of Consciousness: Arousable    Pulmonary Status:   O2 Device: Nasal cannula (12/22/17 1251)   Adequate oxygenation and airway patent    Complications related to anesthesia: None    Post-anesthesia assessment completed.  No concerns    Signed By: Leda Boyce MD     December 22, 2017

## 2017-12-22 NOTE — ANESTHESIA PREPROCEDURE EVALUATION
Anesthetic History   No history of anesthetic complications            Review of Systems / Medical History  Patient summary reviewed, nursing notes reviewed and pertinent labs reviewed    Pulmonary  Within defined limits                 Neuro/Psych   Within defined limits           Cardiovascular  Within defined limits                     GI/Hepatic/Renal  Within defined limits              Endo/Other  Within defined limits           Other Findings   Comments:   Risk Factors for Postoperative nausea/vomiting:       History of postoperative nausea/vomiting? NO       Female? NO       Motion sickness? NO       Intended opioid administration for postoperative analgesia? YES      Smoking Abstinence  Current Smoker? NO  Elective Surgery? YES  Seen preoperatively by anesthesiologist or proxy prior to day of surgery? YES  Pt abstained from smoking 24 hours prior to anesthesia?  YES           Physical Exam    Airway  Mallampati: I  TM Distance: 4 - 6 cm  Neck ROM: normal range of motion   Mouth opening: Normal     Cardiovascular    Rhythm: regular  Rate: normal         Dental    Dentition: Caps/crowns     Pulmonary  Breath sounds clear to auscultation               Abdominal  GI exam deferred       Other Findings            Anesthetic Plan    ASA: 2  Anesthesia type: general      Post-op pain plan if not by surgeon: peripheral nerve block single    Induction: Intravenous  Anesthetic plan and risks discussed with: Patient

## 2017-12-22 NOTE — ANESTHESIA PROCEDURE NOTES
Peripheral Block    Start time: 12/22/2017 9:40 AM  End time: 12/22/2017 9:46 AM  Performed by: Emerald Rosenberg by: Brandan Maxwell       Pre-procedure: Indications: at surgeon's request, post-op pain management and procedure for pain    Preanesthetic Checklist: patient identified, risks and benefits discussed, site marked, timeout performed, anesthesia consent given and patient being monitored      Block Type:   Block Type: Adductor canal  Laterality:  Left  Monitoring:  Standard ASA monitoring, continuous pulse ox, frequent vital sign checks, oxygen, heart rate and responsive to questions  Injection Technique:  Single shot  Procedures: ultrasound guided and nerve stimulator    Patient Position: supine  Prep: chlorhexidine    Location:  Upper thigh  Needle Type:  Stimuplex  Needle Gauge:  21 G  Needle Localization:  Ultrasound guidance, nerve stimulator and anatomical landmarks  Medication Injected:  0.2%  ropivacaine  Volume (mL):  30    Assessment:  Number of attempts:  1  Injection Assessment:  No paresthesia, incremental injection every 5 mL, ultrasound image on chart, no intravascular symptoms, negative aspiration for blood and local visualized surrounding nerve on ultrasound  Patient tolerance:  Patient tolerated the procedure well with no immediate complications  Location:  PREOP HOLDING    Patient given 2 mg IV Versed and 100 mcg IV Fentanyl for sedation.     12/22/2017     9:56 AM     Shannan Cardona MD

## 2017-12-22 NOTE — ROUTINE PROCESS
Bedside and Verbal shift change report given to Elisabet Aaron (oncoming nurse) by Suzie Ward (offgoing nurse). Report included the following information SBAR, Kardex, MAR and Recent Results.     SITUATION:    Code Status: Prior   Reason for Admission: Post-traumatic osteoarthritis of left knee [address] day: 0   Problem List:       Hospital Problems  Date Reviewed: 12/22/2017          Codes Class Noted POA    Knee osteoarthritis ICD-10-CM: M17.10  ICD-9-CM: 715.36  12/22/2017 Unknown              BACKGROUND:    Past Medical History:   Past Medical History:   Diagnosis Date    ADD (attention deficit disorder)     Anxiety     Chronic venous insufficiency     Erectile dysfunction     Hypercholesterolemia          Patient taking anticoagulants yes     ASSESSMENT:    Changes in Assessment Throughout Shift: no     Patient has Central Line: no    Patient has Moreno Cath: no       Last Vitals:     Vitals:    12/22/17 1351 12/22/17 1407 12/22/17 1436 12/22/17 1616   BP: 137/72  135/78 128/72   Pulse: 88  94 89   Resp: 12  15 16   Temp:  98.5 °F (36.9 °C) 97 °F (36.1 °C) 98.1 °F (36.7 °C)   SpO2: 100%  98% 99%   Weight:       Height:            IV and DRAINS (will only show if present)   Peripheral IV 12/22/17 Right Hand-Site Assessment: Clean, dry, & intact  Nader-Tolentino Drain 12/22/17 Left Knee-Site Assessment: Clean, dry, & intact     WOUND (if present)   Wound Type:  Left knee, with ice man    Dressing present Dressing Present : Intact, not due to be changed   Wound Concerns/Notes:  none     PAIN    Pain Assessment    Pain Intensity 1: 7 (12/22/17 1745)    Pain Location 1: Knee    Pain Intervention(s) 1: Medication (see MAR)    Patient Stated Pain Goal: 3  o Interventions for Pain:  roxicodone  o Intervention effective: yes  o Time of last intervention: see mar   o Reassessment Completed: yes      Last 3 Weights:  Last 3 Recorded Weights in this Encounter    12/18/17 0945 12/22/17 0915   Weight: 84.8 kg (187 lb) 85.8 kg (189 lb 3.2 oz)     Weight change:      INTAKE/OUPUT    Current Shift: 12/22 0701 - 12/22 1900  In: 2749.2 [P.O.:120; I.V.:2629.2]  Out: 140 [Drains:110]    Last three shifts:       LAB RESULTS   No results for input(s): WBC, HGB, HCT, PLT, HGBEXT, HCTEXT, PLTEXT in the last 72 hours. Recent Labs      12/22/17   1518   NA  143   K  4.4   GLU  94   BUN  8   CREA  1.04   CA  8.1*       RECOMMENDATIONS AND DISCHARGE PLANNING     1. Pending tests/procedures/ Plan of Care or Other Needs: no     2. Discharge plan for patient and Needs/Barriers: no    3. Estimated Discharge Date: n/a Posted on Whiteboard in Patients Room: no      4. The patient's care plan was reviewed with the oncoming nurse. \"HEALS\" SAFETY CHECK      Fall Risk    Total Score: 1    Safety Measures: Safety Measures: Bed/Chair-Wheels locked, Bed in low position, Caregiver at bedside, Call light within reach, Gripper socks, Side rails X 3    A safety check occurred in the patient's room between off going nurse and oncoming nurse listed above. The safety check included the below items  Area Items   H  High Alert Medications - Verify all high alert medication drips (heparin, PCA, etc.)   E  Equipment - Suction is set up for ALL patients (with chinmayker)  - Red plugs utilized for all equipment (IV pumps, etc.)  - WOWs wiped down at end of shift.  - Room stocked with oxygen, suction, and other unit-specific supplies   A  Alarms - Bed alarm is set for fall risk patients  - Ensure chair alarm is in place and activated if patient is up in a chair   L  Lines - Check IV for any infiltration  - Moreno bag is empty if patient has a Moreno   - Tubing and IV bags are labeled   S  Safety   - Room is clean, patient is clean, and equipment is clean. - Hallways are clear from equipment besides carts.    - Fall bracelet on for fall risk patients  - Ensure room is clear and free of clutter  - Suction is set up for ALL patients (with henry)  - Hallways are clear from equipment besides carts.    - Isolation precautions followed, supplies available outside room, sign posted     Boucher Poster

## 2017-12-22 NOTE — H&P
History and Physical    Subjective:     Joleen Morales is a 59 y.o. male presenting for elective left total knee arthroplasty    Past Medical History:   Diagnosis Date    ADD (attention deficit disorder)     Anxiety     Chronic venous insufficiency     Erectile dysfunction     Hypercholesterolemia       Past Surgical History:   Procedure Laterality Date    HX HEENT      Vocal cord- removal of calcium deposits    HX HERNIA REPAIR      as a toddler    HX OTHER SURGICAL      nose / sinus surgery 2000    HX OTHER SURGICAL      Lower arm surgery 1998    HX OTHER SURGICAL      Hand surgery 1998    HX OTHER SURGICAL Bilateral     eye surgery    HX OTHER SURGICAL      Aortic bypass graft - left leg     Family History   Problem Relation Age of Onset    Cancer Mother      LIVER CA    Pneumonia Father     No Known Problems Sister     No Known Problems Brother       Social History   Substance Use Topics    Smoking status: Never Smoker    Smokeless tobacco: Never Used    Alcohol use 3.6 oz/week     6 Cans of beer per week       Prior to Admission medications    Medication Sig Start Date End Date Taking? Authorizing Provider   rosuvastatin (CRESTOR) 20 mg tablet Take 20 mg by mouth nightly. Yes Historical Provider   buPROPion (WELLBUTRIN) 75 mg tablet Take 150 mg by mouth daily. Yes Historical Provider   multivitamins-minerals-lutein (MEN'S CENTRUM SILVER WITH LUTEIN) tab tablet Take 1 Tab by Mouth Once a Day. Yes Historical Provider   CIALIS 5 mg tablet  1/9/17   Historical Provider   UBIDECARENONE/VITAMIN E MIXED (COQ10  PO) Take  by mouth. Historical Provider   dextroamphetamine-amphetamine (ADDERALL) 20 mg tablet Take 20 mg by mouth dailyIndications: Attention-Deficit Hyperactivity Disorder. Historical Provider   clonazePAM (KLONOPIN) 0.5 mg disintegrating tablet Take 0.5 mg by mouth three (3) times daily. Historical Provider   flaxseed oil 1,000 mg cap 1,000 mg. Historical Provider     Allergies   Allergen Reactions    Penicillins Anaphylaxis     Experienced seizures    Sulfur Other (comments)     FDE(fixed drug erruptions)        Review of Systems:  As per HPI    Objective:     Temp: 98.9 °F (37.2 °C) (12/22/17 0915) Pulse (Heart Rate): 80 (12/22/17 0915) Resp Rate: 14 (12/22/17 0915) BP: 134/71 (12/22/17 0915) O2 Sat (%): 96 % (12/22/17 0915) Weight: 85.8 kg (189 lb 3.2 oz) (12/22/17 0915)     LLE with effusion, varus alignment. ROM 3-120. Distal examination intact    Assessment:     Active Problems:    Knee osteoarthritis (12/22/2017)        Plan:      Will proceed with L TKA as scheduled    Signed By: Mini Petersen MD     December 22, 2017

## 2017-12-22 NOTE — IP AVS SNAPSHOT
[address] Patient: Franky Hughes MRN: [id number] SGN:[id number] About your hospitalization You were admitted on:  December 22, 2017 You last received care in the:  [facility] [address] You were discharged on:  December 24, 2017 Why you were hospitalized Your primary diagnosis was:  Not on File Your diagnoses also included:  Knee Osteoarthritis Things You Need To Do (next 8 weeks) Follow up with BARBARA Toth Phone:  [phone] Where:  [address], [address] [facility] Discharge Orders None A check loren indicates which time of day the medication should be taken. My Medications TAKE these medications as instructed Instructions Each Dose to Equal  
 Morning Noon Evening Bedtime  
 acetaminophen 500 mg tablet Commonly known as:  TYLENOL Your last dose was: Your next dose is: Take 2 Tabs by mouth every eight (8) hours for 7 days. Indications: Pain  
 1000 mg ADDERALL 20 mg tablet Generic drug:  dextroamphetamine-amphetamine Your last dose was: Your next dose is: Take 20 mg by mouth dailyIndications: Attention-Deficit Hyperactivity Disorder. 20 mg  
    
   
   
   
  
 celecoxib 200 mg capsule Commonly known as:  CELEBREX Your last dose was: Your next dose is: Take 1 Cap by mouth two (2) times a day for 21 days. Indications: Postoperative Acute Pain 200 mg  
    
   
   
   
  
 CIALIS 5 mg tablet Generic drug:  tadalafil Your last dose was: Your next dose is:    
   
   
      
   
   
   
  
 clonazePAM 0.5 mg disintegrating tablet Commonly known as:  Neha Pelt Your last dose was: Your next dose is: Take 0.5 mg by mouth three (3) times daily.   
 0.5 mg  
    
   
   
   
  
 COQ10  PO Your last dose was: Your next dose is: Take  by mouth. CRESTOR 20 mg tablet Generic drug:  rosuvastatin Your last dose was: Your next dose is: Take 20 mg by mouth nightly. 20 mg  
    
   
   
   
  
 flaxseed oil 1,000 mg Cap Your last dose was: Your next dose is:    
   
   
 1,000 mg.  
 1000 mg  
    
   
   
   
  
 multivitamins-minerals-lutein Tab tablet Commonly known as:  MEN'S CENTRUM SILVER WITH LUTEIN Your last dose was: Your next dose is: Take 1 Tab by Mouth Once a Day. ondansetron 4 mg disintegrating tablet Commonly known as:  ZOFRAN ODT Your last dose was: Your next dose is: Take 1-2 Tabs by mouth every eight (8) hours as needed for Nausea. Indications: PREVENTION OF POST-OPERATIVE NAUSEA AND VOMITING  
 4-8 mg  
    
   
   
   
  
 oxyCODONE IR 5 mg immediate release tablet Commonly known as:  Beryl Acosta Your last dose was: Your next dose is: Take 1-3 Tabs by mouth every four (4) hours as needed. Max Daily Amount: 90 mg.  
 5-15 mg  
    
   
   
   
  
 polyethylene glycol 17 gram packet Commonly known as:  Mindi Beard Start taking on:  12/25/2017 Your last dose was: Your next dose is: Take 1 Packet by mouth daily. 17 g  
    
   
   
   
  
 rivaroxaban 10 mg tablet Commonly known as:  Rock Snowball Your last dose was: Your next dose is: Take 1 Tab by mouth daily (with dinner) for 21 days. Indications: KNEE REPLACEMENT DEEP VEIN THROMBOSIS PREVENTION  
 10 mg WELLBUTRIN 75 mg tablet Generic drug:  buPROPion Your last dose was: Your next dose is: Take 150 mg by mouth daily. 150 mg Where to Get Your Medications Information on where to get these meds will be given to you by the nurse or doctor. ! Ask your nurse or doctor about these medications  
  acetaminophen 500 mg tablet  
 celecoxib 200 mg capsule  
 ondansetron 4 mg disintegrating tablet  
 oxyCODONE IR 5 mg immediate release tablet  
 polyethylene glycol 17 gram packet  
 rivaroxaban 10 mg tablet Discharge Instructions DISCHARGE SUMMARY from Nurse PATIENT INSTRUCTIONS: 
 
 
F-face looks uneven A-arms unable to move or move unevenly S-speech slurred or non-existent T-time-call 911 as soon as signs and symptoms begin-DO NOT go Back to bed or wait to see if you get better-TIME IS BRAIN. Warning Signs of HEART ATTACK Call 911 if you have these symptoms: 
? Chest discomfort. Most heart attacks involve discomfort in the center of the chest that lasts more than a few minutes, or that goes away and comes back. It can feel like uncomfortable pressure, squeezing, fullness, or pain. ? Discomfort in other areas of the upper body. Symptoms can include pain or discomfort in one or both arms, the back, neck, jaw, or stomach. ? Shortness of breath with or without chest discomfort. ? Other signs may include breaking out in a cold sweat, nausea, or lightheadedness. Don't wait more than five minutes to call [address]! Fast action can save your life. Calling 911 is almost always the fastest way to get lifesaving treatment. Emergency Medical Services staff can begin treatment when they arrive  up to an hour sooner than if someone gets to the hospital by car. Patient armband removed and shredded. The discharge information has been reviewed with the patient. The patient verbalized understanding. Discharge medications reviewed with the patient and appropriate educational materials and side effects teaching were provided. _____Weekdone[employer] Activation Thank you for requesting access to SnapTell. Please follow the instructions below to securely access and download your online medical record. SnapTell allows you to send messages to your doctor, view your test results, renew your prescriptions, schedule appointments, and more. How Do I Sign Up? 1. In your internet browser, go to [email or web address]. 2. Click on the First Time User? Click Here link in the Sign In box. You will see the New Member Sign Up page. 3. Enter your SnapTell Access Code exactly as it appears below. You will not need to use this code after youve completed the sign-up process. If you do not sign up before the expiration date, you must request a new code. SnapTell Access Code: Activation code not generated Current SnapTell Status: Patient Declined (This is the date your SnapTell access code will ) 4. Enter the last four digits of your Social Security Number (xxxx) and Date of Birth (mm/dd/yyyy) as indicated and click Submit. You will be taken to the next sign-up page. 5. Create a SnapTell ID. This will be your SnapTell login ID and cannot be changed, so think of one that is secure and easy to remember. 6. Create a SnapTell password. You can change your password at any time. 7. Enter your Password Reset Question and Answer. This can be used at a later time if you forget your password. 8. Enter your e-mail address. You will receive e-mail notification when new information is available in [address] 9. Click Sign Up. You can now view and download portions of your medical record. 10. Click the Download Summary menu link to download a portable copy of your medical information. Additional Information If you have questions, please visit the Frequently Asked Questions section of the [employer] website at [email or web address] Remember, MyChart is NOT to be used for urgent needs. For medical emergencies, dial 911. 
 
______________________________________________________________________________________________________________________________ MyChart Announcement We are excited to announce that we are making your provider's discharge notes available to you in [employer]. You will see these notes when they are completed and signed by the physician that discharged you from your recent hospital stay. If you have any questions or concerns about any information you see in [employer], please call the Health Information Department where you were seen or reach out to your Primary Care Provider for more information about your plan of care. Providers Seen During Your Hospitalization Provider Specialty Primary office phone Huyen Neal MD Orthopedic Surgery [phone] Your Primary Care Physician (PCP) Primary Care Physician Office Phone Office Fax Sarah Kuhn [phone] You are allergic to the following Allergen Reactions Penicillins Anaphylaxis Experienced seizures Sulfur Other (comments) FDE(fixed drug erruptions) Recent Documentation Height Weight BMI Smoking Status 1.778 m 85.8 kg 27.15 kg/m2 Never Smoker Emergency Contacts Name Discharge Info Relation Home Work Mobile Jose Christopher DISCHARGE CAREGIVER [3] Son [22]   [phone] Patient Belongings The following personal items are in your possession at time of discharge: 
  Dental Appliances: None  Visual Aid: None          Jewelry: None  Clothing: Pants, Shirt, Undergarments, Footwear    Other Valuables: None Please provide this summary of care documentation to your next provider. Signatures-by signing, you are acknowledging that this After Visit Summary has been reviewed with you and you have received a copy. Patient Signature:  ____________________________________________________________ Date:  ____________________________________________________________  
  
Jus Artist Provider Signature:  ____________________________________________________________ Date:  ____________________________________________________________

## 2017-12-22 NOTE — PROCEDURES
Pre-op DX: L knee posttraumatic Varus OA  Post-op DX: same  Procedure: L PS fixed bearing tricompartmental knee arthroplasty  Anesthesia: GETA, adductor canal block, exparel  Surgeon: seven  Antibiotics: 1g vancomycin  EBL: 100mL  Implants:  -Depuy attune size 8 L PS femur  -Depuy attune size 7 fixed bearing tibial tray  -12mm size 8 PS poly  -41mm anatomic patella    Plan:  -PT/OT  -xarelto x 21 days  -drain out POD#1  -Pain control  -clindamycin x 23 hours  -admit to ortho  -routine post-op care  -Dispo: home with home health tomorrow    Op note [id number]

## 2017-12-22 NOTE — PERIOP NOTES
TRANSFER - OUT REPORT:    Verbal report given to Lovely KIRK Tovar on Katelynn Rothman  being transferred to [phone] for routine post - op       Report consisted of patients Situation, Background, Assessment and   Recommendations(SBAR). Information from the following report(s) SBAR and Procedure Summary was reviewed with the receiving nurse. Lines:   Peripheral IV 12/22/17 Right Hand (Active)   Site Assessment Clean, dry, & intact 12/22/2017 12:44 PM   Phlebitis Assessment 0 12/22/2017 12:44 PM   Infiltration Assessment 0 12/22/2017 12:44 PM   Dressing Status Clean, dry, & intact 12/22/2017 12:44 PM   Dressing Type Transparent;Tape 12/22/2017 12:44 PM   Hub Color/Line Status Pink 12/22/2017 12:44 PM   Alcohol Cap Used No 12/22/2017  9:17 AM        Opportunity for questions and clarification was provided.       Patient transported with:   Registered Nurse

## 2017-12-22 NOTE — ROUTINE PROCESS
Pt is in stable condition. Alert and oriented x 4. VSS. Denies any acute distress, chest pain or SOB. Dressing to left knee is CDI with ice man, hemovac in place and patent draining serousanginious. Pt has + pulses, + sensation skin is warm to touch. Pt denies tingling and numbness. Cap refill <3 sec. Pt is up in chair. Prn pain med's given as needed. Verbalizes No further needs. Call bell is within reach, bed in low position. Will continue to monitor.

## 2017-12-22 NOTE — ROUTINE PROCESS
Patient arrived from PACU in stable condition, Alert and oriented x 4. Easily arousable, Speech is clear. Denies any acute distress, SOB or Chest pain. Patient is on 3 L  Of 02 NC. Left knee  With ice man opsite dressing and ace bandage, + pedal pulses, + sensation,Patient is oriented to the room, call bell use. Bed in low position. Will continue to monitor.

## 2017-12-23 LAB
HCT VFR BLD AUTO: 34.7 % (ref 36–48)
HGB BLD-MCNC: 11.2 G/DL (ref 13–16)

## 2017-12-23 PROCEDURE — 97110 THERAPEUTIC EXERCISES: CPT

## 2017-12-23 PROCEDURE — 97165 OT EVAL LOW COMPLEX 30 MIN: CPT

## 2017-12-23 PROCEDURE — 36415 COLL VENOUS BLD VENIPUNCTURE: CPT | Performed by: ORTHOPAEDIC SURGERY

## 2017-12-23 PROCEDURE — 74011250636 HC RX REV CODE- 250/636: Performed by: ORTHOPAEDIC SURGERY

## 2017-12-23 PROCEDURE — 85018 HEMOGLOBIN: CPT | Performed by: ORTHOPAEDIC SURGERY

## 2017-12-23 PROCEDURE — 74011250637 HC RX REV CODE- 250/637: Performed by: ORTHOPAEDIC SURGERY

## 2017-12-23 PROCEDURE — 65270000029 HC RM PRIVATE

## 2017-12-23 PROCEDURE — 97116 GAIT TRAINING THERAPY: CPT

## 2017-12-23 PROCEDURE — 74011000258 HC RX REV CODE- 258: Performed by: ORTHOPAEDIC SURGERY

## 2017-12-23 PROCEDURE — 74011000250 HC RX REV CODE- 250: Performed by: ORTHOPAEDIC SURGERY

## 2017-12-23 PROCEDURE — 97530 THERAPEUTIC ACTIVITIES: CPT

## 2017-12-23 RX ADMIN — SODIUM CHLORIDE 125 ML/HR: 900 INJECTION, SOLUTION INTRAVENOUS at 06:15

## 2017-12-23 RX ADMIN — Medication 10 ML: at 14:00

## 2017-12-23 RX ADMIN — RIVAROXABAN 10 MG: 10 TABLET, FILM COATED ORAL at 17:00

## 2017-12-23 RX ADMIN — Medication 10 ML: at 06:14

## 2017-12-23 RX ADMIN — OXYCODONE HYDROCHLORIDE 5 MG: 5 TABLET ORAL at 03:29

## 2017-12-23 RX ADMIN — ACETAMINOPHEN 1000 MG: 500 TABLET ORAL at 21:32

## 2017-12-23 RX ADMIN — ROSUVASTATIN CALCIUM 20 MG: 20 TABLET, FILM COATED ORAL at 21:32

## 2017-12-23 RX ADMIN — OXYCODONE HYDROCHLORIDE 15 MG: 5 TABLET ORAL at 09:09

## 2017-12-23 RX ADMIN — CELECOXIB 200 MG: 100 CAPSULE ORAL at 09:09

## 2017-12-23 RX ADMIN — CELECOXIB 200 MG: 100 CAPSULE ORAL at 18:00

## 2017-12-23 RX ADMIN — POLYETHYLENE GLYCOL 3350 17 G: 17 POWDER, FOR SOLUTION ORAL at 09:00

## 2017-12-23 RX ADMIN — ACETAMINOPHEN 1000 MG: 500 TABLET ORAL at 13:29

## 2017-12-23 RX ADMIN — OXYCODONE HYDROCHLORIDE 15 MG: 5 TABLET ORAL at 13:29

## 2017-12-23 RX ADMIN — Medication 10 ML: at 21:40

## 2017-12-23 RX ADMIN — ACETAMINOPHEN 1000 MG: 500 TABLET ORAL at 05:57

## 2017-12-23 RX ADMIN — OXYCODONE HYDROCHLORIDE 15 MG: 5 TABLET ORAL at 21:32

## 2017-12-23 RX ADMIN — SODIUM CHLORIDE 900 MG: 900 INJECTION, SOLUTION INTRAVENOUS at 01:53

## 2017-12-23 NOTE — PROGRESS NOTES
Problem: Self Care Deficits Care Plan (Adult)  Goal: *Acute Goals and Plan of Care (Insert Text)  Outcome: Resolved/Met Date Met: 12/23/17  Occupational Therapy EVALUATION/discharge    Patient: Maurisio Hermosillo (43 y.o. male)  Date: 12/23/2017  Primary Diagnosis: Post-traumatic osteoarthritis of left knee [M17.32]  Procedure(s) (LRB):  LEFT TOTAL KNEE REPLACEMENT/DEPUY/2 SA'S/FEMORAL NERVE BLOCK (Left) 1 Day Post-Op   Precautions:   Fall    ASSESSMENT AND RECOMMENDATIONS:  Based on the objective data described below, the patient is able to perform basic self care tasks without assistance. Supervision given for functional standing and transfers. Will defer to PT for mobility training. Patient has a supportive son at home to assist him prn. Skilled occupational therapy is not indicated at this time. Discharge Recommendations: None  Further Equipment Recommendations for Discharge: RW     Barriers to Learning/Limitations: None  Compensate with: visual, verbal, tactile, kinesthetic cues/model     COMPLEXITY     Eval Complexity: History: LOW Complexity : Brief history review ; Examination: LOW Complexity : 1-3 performance deficits relating to physical, cognitive , or psychosocial skils that result in activity limitations and / or participation restrictions ; Decision Making:LOW Complexity : No comorbidities that affect functional and no verbal or physical assistance needed to complete eval tasks  Assessment: Low Complexity        G-CODES:     Self Care  Current  CI= 1-19%   Goal  CI= 1-19%   D/C  CI= 1-19%. The severity rating is based on the Level of Assistance required for Functional Mobility and ADLs. SUBJECTIVE:   Patient stated I can't bend my knee well because of all the bulky dressing.     OBJECTIVE DATA SUMMARY:     Past Medical History:   Diagnosis Date    ADD (attention deficit disorder)     Anxiety     Chronic venous insufficiency     Erectile dysfunction     Hypercholesterolemia Past Surgical History:   Procedure Laterality Date    HX HEENT      Vocal cord- removal of calcium deposits    HX HERNIA REPAIR      as a toddler    HX OTHER SURGICAL      nose / sinus surgery 2000    HX OTHER SURGICAL      Lower arm surgery 1998    HX OTHER SURGICAL      Hand surgery 1998    HX OTHER SURGICAL Bilateral     eye surgery    HX OTHER SURGICAL      Aortic bypass graft - left leg     Prior Level of Function/Home Situation: Pt was independent with basic self care tasks and functional mobility PTA. Home Situation  Home Environment: Private residence  # Steps to Enter: 4  One/Two Story Residence: One story  Living Alone: No  Support Systems: Family member(s), Friends \ neighbors  Patient Expects to be Discharged to[de-identified] Private residence  Current DME Used/Available at Home: Crutches, Walker  Tub or Shower Type: Shower  [x]     Right hand dominant   []     Left hand dominant  Cognitive/Behavioral Status:  Neurologic State: Alert  Orientation Level: Oriented X4  Cognition: Appropriate decision making; Follows commands  Safety/Judgement: Awareness of environment; Fall prevention    Skin: Intact on UEs    Edema: None noted in UEs    Vision/Perceptual:    Acuity: Within Defined Limits    Corrective Lenses: Glasses    Coordination:  Fine Motor Skills-Upper: Left Intact; Right Intact    Gross Motor Skills-Upper: Left Intact; Right Intact    Balance:  Sitting: Intact  Standing: With support    Strength:  Strength:  Within functional limits (UEs)    Tone & Sensation:  Tone: Normal (UEs)  Sensation: Intact (UEs)    Range of Motion:  AROM: Within functional limits (UEs)    Functional Mobility and Transfers for ADLs:  Bed Mobility:  Supine to Sit: Supervision  Sit to Supine: Supervision  Transfers:  Sit to Stand: Supervision   Toilet Transfer : Supervision    ADL Assessment:  Feeding: Independent    Oral Facial Hygiene/Grooming: Independent    Bathing: Supervision    Upper Body Dressing: Independent    Lower Body Dressing: Supervision    Toileting: Independent    Pain:  Pt reports 4/10 pain or discomfort prior to treatment, in left knee. Pain meds given prior to session.    Pt reports 4/10 pain or discomfort post treatment, in left knee. Patient resting in bed at end of session. Activity Tolerance:   Good    Please refer to the flowsheet for vital signs taken during this treatment. After treatment:   []  Patient left in no apparent distress sitting up in chair  [x]  Patient left in no apparent distress in bed  [x]  Call bell left within reach  []  Nursing notified  []  Caregiver present  []  Bed alarm activated    COMMUNICATION/EDUCATION:   Communication/Collaboration:  [x]      Home safety education was provided and the patient/caregiver indicated understanding. [x]      Patient/family have participated as able and agree with findings and recommendations. []      Patient is unable to participate in plan of care at this time.     Jeniffer Carranza MS OTR/L  Time Calculation: 15 mins

## 2017-12-23 NOTE — PROGRESS NOTES
Bedside and Verbal shift change report given to Fidel Park RN (oncoming nurse) by Jessica Runner, RN (offgoing nurse). Report included the following information SBAR, Kardex, OR Summary, Intake/Output and MAR.

## 2017-12-23 NOTE — PROGRESS NOTES
Problem: Mobility Impaired (Adult and Pediatric)  Goal: *Acute Goals and Plan of Care (Insert Text)  Physical Therapy Goals  Initiated 12/22/2017 and to be accomplished within 7 day(s)  1. Patient will move from supine to sit and sit to supine  in bed with modified independence. 2.  Patient will transfer from bed to chair and chair to bed with modified independence using the least restrictive device. 3.  Patient will perform sit to stand with modified independence. 4.  Patient will ambulate with supervision/set-up for 300 feet with the least restrictive device. 5.  Patient will ascend/descend 4 stairs with 2 handrail(s) with minimal assistance/contact guard assist.   Outcome: Progressing Towards Goal  physical Therapy TREATMENT    Patient: Elliott Ceballos (75 y.o. male)  Date: 12/23/2017  Diagnosis: Post-traumatic osteoarthritis of left knee [M17.32] <principal problem not specified>  Procedure(s) (LRB):  LEFT TOTAL KNEE REPLACEMENT/DEPUY/2 SA'S/FEMORAL NERVE BLOCK (Left) 1 Day Post-Op  Precautions: Fall   Chart, physical therapy assessment, plan of care and goals were reviewed. ASSESSMENT:  Pt found supine in bed willing to work with PT. Pt is able to perform SLR in supine. Pt sat at EOB then ambulated into hallway greatly increasing distance. Pt is able to negotiate 22 total stairs this tx with bilateral handrails. Pt begins with step to gait, but switches to reciprocal on his own. Pt returned to b/s chair where he performed therex. Pt left with needs in reach and ICEMAN applied. Reviewed safety at home post DC and activity expectations. Pt provided an extensive history of his athletic endeavors, stating that he is motivated to recover and will adhere to HEP. Education: therex, gait.   Progression toward goals:  []      Improving appropriately and progressing toward goals  [x]      Improving slowly and progressing toward goals  []      Not making progress toward goals and plan of care will be adjusted PLAN:  Patient continues to benefit from skilled intervention to address the above impairments. Continue treatment per established plan of care. Discharge Recommendations:  HHPT  Further Equipment Recommendations for Discharge:  rolling walker     SUBJECTIVE:   Patient stated We won the [employer] 3 years in a row.     OBJECTIVE DATA SUMMARY:   Critical Behavior:  Neurologic State: Alert  Orientation Level: Oriented X4  Cognition: Appropriate decision making, Follows commands  Safety/Judgement: Awareness of environment, Fall prevention  Functional Mobility Training:  Bed Mobility:  Supine to Sit: Supervision  Sit to Supine: Supervision  Transfers:  Sit to Stand: Supervision  Stand to Sit: Supervision  Balance:  Sitting: Intact  Standing: Impaired; With support  Standing - Static: Good  Standing - Dynamic : Good (-)  Ambulation/Gait Training:  Distance (ft): 450 Feet (ft)  Assistive Device: Walker, rolling  Ambulation - Level of Assistance: Supervision  Gait Abnormalities: Antalgic;Decreased step clearance  Left Side Weight Bearing: As tolerated  Therapeutic Exercises:   LAQ x10 on left. Self assisted knee flexion with contralateral LE overpressure x 10. Pain:  Pre tx pain: 0  Post tx pain: Not rated, does report pain at drain insertion site. Pain Scale 1: Numeric (0 - 10)  Pain Intensity 1: 0  Pain Location 1: Knee  Pain Orientation 1: Left  Pain Description 1: Aching  Pain Intervention(s) 1: Rest;Ice;Elevation  Activity Tolerance:   Fair  Please refer to the flowsheet for vital signs taken during this treatment. After treatment:   [x] Patient left in no apparent distress sitting up in chair  [] Patient left in no apparent distress in bed  [x] Call bell left within reach  [] Nursing notified  [] Caregiver present  [] Bed alarm activated  [x] SCDs applied      Ninfa Hernandez PTA   Time Calculation: 53 mins    Mobility  Current  CI= 1-19%.   The severity rating is based on the Level of Assistance required for Functional Mobility and ADLs. Mobility   Goal  CI= 1-19%. The severity rating is based on the Level of Assistance required for Functional Mobility and ADLs.

## 2017-12-23 NOTE — PROGRESS NOTES
Problem: Mobility Impaired (Adult and Pediatric)  Goal: *Acute Goals and Plan of Care (Insert Text)  Physical Therapy Goals  Initiated 12/22/2017 and to be accomplished within 7 day(s)  1. Patient will move from supine to sit and sit to supine  in bed with modified independence. 2.  Patient will transfer from bed to chair and chair to bed with modified independence using the least restrictive device. 3.  Patient will perform sit to stand with modified independence. 4.  Patient will ambulate with supervision/set-up for 300 feet with the least restrictive device. 5.  Patient will ascend/descend 4 stairs with 2 handrail(s) with minimal assistance/contact guard assist.     0910 - Pt is awaiting pain medication and would like PT to f/u later in AM. Will f/u.

## 2017-12-23 NOTE — PROGRESS NOTES
Problem: Falls - Risk of  Goal: *Absence of Falls  Document Larry Fall Risk and appropriate interventions in the flowsheet.    Outcome: Progressing Towards Goal  Fall Risk Interventions:  Mobility Interventions: Patient to call before getting OOB         Medication Interventions: Patient to call before getting OOB, Teach patient to arise slowly    Elimination Interventions: Patient to call for help with toileting needs, Call light in reach

## 2017-12-23 NOTE — PROGRESS NOTES
[facility] progress note    POD# 1 Following L total knee arthroplasty    S: awaiting PT    O:  Temp: 97.7 °F (36.5 °C) (12/23/17 0759) Pulse (Heart Rate): 99 (12/23/17 0759) Resp Rate: 16 (12/23/17 0759) BP: 136/81 (12/23/17 0759) O2 Sat (%): 100 % (12/23/17 0759) Weight: 85.8 kg (189 lb 3.2 oz) (12/22/17 0915)     Drain 420    Knee dressings C/D/I  Intact EHL, FHL, GS, TA motor function  L3-S1 sensation intact  Calf soft, nontender  DP pulse palpable    Labs:  hgb- 11.2    O:  -Weight bearing status: as tolerated  -antibiotics: clindamycin x 23 hours  -Diet: regular  -DVT prophylaxis: Xarelto x 21 days  -Pain control: oxycodone, exparel, celebrex, tylenol  -Dispo: Home with [facility] home health when stable

## 2017-12-23 NOTE — PROGRESS NOTES

## 2017-12-23 NOTE — PROGRESS NOTES
2151  Received pt in stable condition,   General: lying in bed in supine, not apparent distress  Neuro: AOx4, denies numbness, 0 tingling, able to wiggle toes to bilateral lower extremities  Cardio: pedal pulses palpable, denies chest pain  Respiratory: denies shortness of breath  Skin: Dressing to left knee clean, dry and intact, polar ice in place.   GI: voiding  : denies nausea and vomiting  Mus: WBAT  Bed in low position and call bell within reach.

## 2017-12-24 VITALS
WEIGHT: 189.2 LBS | BODY MASS INDEX: 27.09 KG/M2 | HEIGHT: 70 IN | RESPIRATION RATE: 18 BRPM | SYSTOLIC BLOOD PRESSURE: 138 MMHG | TEMPERATURE: 98.2 F | DIASTOLIC BLOOD PRESSURE: 72 MMHG | OXYGEN SATURATION: 96 % | HEART RATE: 90 BPM

## 2017-12-24 LAB
HCT VFR BLD AUTO: 33.6 % (ref 36–48)
HGB BLD-MCNC: 10.6 G/DL (ref 13–16)

## 2017-12-24 PROCEDURE — 36415 COLL VENOUS BLD VENIPUNCTURE: CPT | Performed by: ORTHOPAEDIC SURGERY

## 2017-12-24 PROCEDURE — 85018 HEMOGLOBIN: CPT | Performed by: ORTHOPAEDIC SURGERY

## 2017-12-24 PROCEDURE — 74011250637 HC RX REV CODE- 250/637: Performed by: ORTHOPAEDIC SURGERY

## 2017-12-24 RX ORDER — POLYETHYLENE GLYCOL 3350 17 G/17G
17 POWDER, FOR SOLUTION ORAL DAILY
Qty: 15 PACKET | Refills: 0 | Status: SHIPPED | OUTPATIENT
Start: 2017-12-25 | End: 2018-04-12

## 2017-12-24 RX ORDER — ONDANSETRON 4 MG/1
4-8 TABLET, ORALLY DISINTEGRATING ORAL
Qty: 15 TAB | Refills: 0 | Status: SHIPPED | OUTPATIENT
Start: 2017-12-24 | End: 2018-04-12 | Stop reason: ALTCHOICE

## 2017-12-24 RX ORDER — CELECOXIB 200 MG/1
200 CAPSULE ORAL 2 TIMES DAILY
Qty: 42 CAP | Refills: 0 | Status: SHIPPED | OUTPATIENT
Start: 2017-12-24 | End: 2018-01-14

## 2017-12-24 RX ORDER — OXYCODONE HYDROCHLORIDE 5 MG/1
5-15 TABLET ORAL
Qty: 60 TAB | Refills: 0 | Status: SHIPPED | OUTPATIENT
Start: 2017-12-24 | End: 2018-04-12

## 2017-12-24 RX ORDER — ACETAMINOPHEN 500 MG
1000 TABLET ORAL EVERY 8 HOURS
Qty: 42 TAB | Refills: 0 | Status: SHIPPED | OUTPATIENT
Start: 2017-12-24 | End: 2017-12-31

## 2017-12-24 RX ADMIN — OXYCODONE HYDROCHLORIDE 15 MG: 5 TABLET ORAL at 06:36

## 2017-12-24 RX ADMIN — ACETAMINOPHEN 1000 MG: 500 TABLET ORAL at 13:35

## 2017-12-24 RX ADMIN — OXYCODONE HYDROCHLORIDE 15 MG: 5 TABLET ORAL at 10:59

## 2017-12-24 RX ADMIN — ACETAMINOPHEN 1000 MG: 500 TABLET ORAL at 05:45

## 2017-12-24 RX ADMIN — OXYCODONE HYDROCHLORIDE 15 MG: 5 TABLET ORAL at 02:42

## 2017-12-24 RX ADMIN — OXYCODONE HYDROCHLORIDE 15 MG: 5 TABLET ORAL at 15:46

## 2017-12-24 RX ADMIN — CELECOXIB 200 MG: 100 CAPSULE ORAL at 08:18

## 2017-12-24 RX ADMIN — Medication 10 ML: at 05:59

## 2017-12-24 NOTE — PROGRESS NOTES
Care Management Interventions  PCP Verified by CM: Yes  Mode of Transport at Discharge:  (family)  Transition of Care Consult (CM Consult): [address]: No  Reason Outside [place]: Physician referred to specific agency  Discharge Durable Medical Equipment: Yes  Physical Therapy Consult: Yes  Occupational Therapy Consult: Yes  Current Support Network: Lives Alone, Family Lives Nearby  Confirm Follow Up Transport: Family  Plan discussed with Pt/Family/Caregiver: Yes  Freedom of Choice Offered: Yes (home health)  Discharge Location  Discharge Placement: Home with home health    0 - Pt is a 59year old admitted for post-traumatic osteoarthritis of left knee. Pt reports that he lives alone and that prior to admission he was independent in his ADLs and that he has no DME at home. Pt will need a RW prior to discharge. Pt reports that his son will be staying with him for 1 week. Pt reports that he has transportation home with family on discharge and that he is hoping to discharge home today. RW order faxed to [facility]. Called [facility] to notify of need for RW.  on call is [place] and they state that they will be sending him a text to give this CM a call. MD requested [facility]. [address] signed for [employer] [facility]. Referral sent via Corrinne Dearth. Called [facility] and they requested referral to be faxed to [phone]. Referral faxed via Corrinne Dearth. 1300 - [facility]  did not call back and did not deliver walker. Called [facility] again and got hung up on. Called [facility] again and asked why no one has called and walker has not been delivered and was placed on hold and she states that the  [facility] [place] had to deliver an emergency oxygen and that the  Carissa is on the way with pt's walker. 36 - Checked with nursing staff and they report that pt has received his walker.

## 2017-12-24 NOTE — ROUTINE PROCESS
Bedside and Verbal shift change report given to TBD (oncoming nurse) by Tomasa Eastman RN (offgoing nurse). Report included the following information SBAR, Kardex, MAR and Recent Results.     SITUATION:    Code Status: Prior   Reason for Admission: Post-traumatic osteoarthritis of left knee [address] day: 2   Problem List:       Hospital Problems  Date Reviewed: 12/22/2017          Codes Class Noted POA    Knee osteoarthritis ICD-10-CM: M17.10  ICD-9-CM: 715.36  12/22/2017 Unknown              BACKGROUND:    Past Medical History:   Past Medical History:   Diagnosis Date    ADD (attention deficit disorder)     Anxiety     Chronic venous insufficiency     Erectile dysfunction     Hypercholesterolemia          Patient taking anticoagulants no     ASSESSMENT:    Changes in Assessment Throughout Shift: No     Patient has Central Line: no Reasons if yes: No   Patient has Moreno Cath: no Reasons if yes: No      Last Vitals:     Vitals:    12/23/17 0759 12/23/17 1132 12/23/17 1600 12/23/17 1954   BP: 136/81 125/79 126/75 135/71   Pulse: 99 89 82 86   Resp: 16 16 16 17   Temp: 97.7 °F (36.5 °C) 97.4 °F (36.3 °C) 98 °F (36.7 °C) 98.1 °F (36.7 °C)   SpO2: 100% 98% 93% 92%   Weight:       Height:            IV and DRAINS (will only show if present)   Peripheral IV 12/22/17 Right Hand-Site Assessment: Clean, dry, & intact  Nader-Tolentino Drain 12/22/17 Left Knee-Site Assessment: Clean, dry, & intact     WOUND (if present)   Wound Type:  none   Dressing present Dressing Present : Intact, not due to be changed   Wound Concerns/Notes:  none     PAIN    Pain Assessment    Pain Intensity 1: 4 (12/24/17 0247)    Pain Location 1: Knee    Pain Intervention(s) 1: Medication (see MAR)    Patient Stated Pain Goal: 0  o Interventions for Pain:  none  o Intervention effective: no  o Time of last intervention: 0700   o Reassessment Completed: no      Last 3 Weights:  Last 3 Recorded Weights in this Encounter 12/18/17 0945 12/22/17 0915   Weight: 84.8 kg (187 lb) 85.8 kg (189 lb 3.2 oz)     Weight change:      INTAKE/OUPUT    Current Shift: 12/23 1901 - 12/24 0700  In: -   Out: 2960 [Urine:2750; Drains:210]    Last three shifts: 12/22 0701 - 12/23 1900  In: 5047.9 [P.O.:1600; I.V.:3447.9]  Out: 1420 [Urine:800; Drains:590]     LAB RESULTS     Recent Labs      12/23/17   0445   HGB  11.2*   HCT  34.7*        Recent Labs      12/22/17   1518   NA  143   K  4.4   GLU  94   BUN  8   CREA  1.04   CA  8.1*       RECOMMENDATIONS AND DISCHARGE PLANNING     1. Pending tests/procedures/ Plan of Care or Other Needs: TBd     2. Discharge plan for patient and Needs/Barriers: TBd    3. Estimated Discharge Date: TBD Posted on Whiteboard in Patients Room: no      4. The patient's care plan was reviewed with the oncoming nurse. \"HEALS\" SAFETY CHECK      Fall Risk    Total Score: 1    Safety Measures: Safety Measures: Bed/Chair alarm on, Bed/Chair-Wheels locked, Bed in low position, Call light within reach, Fall prevention (comment), Side rails X 3    A safety check occurred in the patient's room between off going nurse and oncoming nurse listed above. The safety check included the below items  Area Items   H  High Alert Medications - Verify all high alert medication drips (heparin, PCA, etc.)   E  Equipment - Suction is set up for ALL patients (with yanker)  - Red plugs utilized for all equipment (IV pumps, etc.)  - WOWs wiped down at end of shift.  - Room stocked with oxygen, suction, and other unit-specific supplies   A  Alarms - Bed alarm is set for fall risk patients  - Ensure chair alarm is in place and activated if patient is up in a chair   L  Lines - Check IV for any infiltration  - Moreno bag is empty if patient has a Moreno   - Tubing and IV bags are labeled   S  Safety   - Room is clean, patient is clean, and equipment is clean. - Hallways are clear from equipment besides carts.    - Fall bracelet on for fall risk patients  - Ensure room is clear and free of clutter  - Suction is set up for ALL patients (with henry)  - Hallways are clear from equipment besides carts.    - Isolation precautions followed, supplies available outside room, sign posted     Kevin Yu RN

## 2017-12-24 NOTE — DISCHARGE INSTRUCTIONS
DISCHARGE SUMMARY from Nurse    PATIENT INSTRUCTIONS:    After general anesthesia or intravenous sedation, for 24 hours or while taking prescription Narcotics:  · Limit your activities  · Do not drive and operate hazardous machinery  · Do not make important personal or business decisions  · Do  not drink alcoholic beverages  · If you have not urinated within 8 hours after discharge, please contact your surgeon on call. Report the following to your surgeon:  · Excessive pain, swelling, redness or odor of or around the surgical area  · Temperature over 100.5  · Nausea and vomiting lasting longer than 4 hours or if unable to take medications  · Any signs of decreased circulation or nerve impairment to extremity: change in color, persistent  numbness, tingling, coldness or increase pain  · Any questions    What to do at Home:  Recommended activity: Activity as tolerated,  instructions. If you experience any of the following symptoms fever,chills,increased pain,swellingor thick yellowish-green drainage from incision, please follow up with . *  Please give a list of your current medications to your Primary Care Provider. *  Please update this list whenever your medications are discontinued, doses are      changed, or new medications (including over-the-counter products) are added. *  Please carry medication information at all times in case of emergency situations. These are general instructions for a healthy lifestyle:    No smoking/ No tobacco products/ Avoid exposure to second hand smoke  Surgeon General's Warning:  Quitting smoking now greatly reduces serious risk to your health.     Obesity, smoking, and sedentary lifestyle greatly increases your risk for illness    A healthy diet, regular physical exercise & weight monitoring are important for maintaining a healthy lifestyle    You may be retaining fluid if you have a history of heart failure or if you experience any of the following symptoms:  Weight gain of 3 pounds or more overnight or 5 pounds in a week, increased swelling in our hands or feet or shortness of breath while lying flat in bed. Please call your doctor as soon as you notice any of these symptoms; do not wait until your next office visit. Recognize signs and symptoms of STROKE:    F-face looks uneven    A-arms unable to move or move unevenly    S-speech slurred or non-existent    T-time-call 911 as soon as signs and symptoms begin-DO NOT go       Back to bed or wait to see if you get better-TIME IS BRAIN. Warning Signs of HEART ATTACK     Call 911 if you have these symptoms:   Chest discomfort. Most heart attacks involve discomfort in the center of the chest that lasts more than a few minutes, or that goes away and comes back. It can feel like uncomfortable pressure, squeezing, fullness, or pain.  Discomfort in other areas of the upper body. Symptoms can include pain or discomfort in one or both arms, the back, neck, jaw, or stomach.  Shortness of breath with or without chest discomfort.  Other signs may include breaking out in a cold sweat, nausea, or lightheadedness. Don't wait more than five minutes to call 911 - MINUTES MATTER! Fast action can save your life. Calling 911 is almost always the fastest way to get lifesaving treatment. Emergency Medical Services staff can begin treatment when they arrive -- up to an hour sooner than if someone gets to the hospital by car. Patient armband removed and shredded. The discharge information has been reviewed with the patient. The patient verbalized understanding. Discharge medications reviewed with the patient and appropriate educational materials and side effects teaching were provided. _____RxAnte Activation    Thank you for requesting access to RxAnte. Please follow the instructions below to securely access and download your online medical record.  RxAnte allows you to send messages to your doctor, view your test results, renew your prescriptions, schedule appointments, and more. How Do I Sign Up? 1. In your internet browser, go to [email or web address]. 2. Click on the First Time User? Click Here link in the Sign In box. You will see the New Member Sign Up page. 3. Enter your [employer]eder Access Code exactly as it appears below. You will not need to use this code after youve completed the sign-up process. If you do not sign up before the expiration date, you must request a new code. MyChart Access Code: Activation code not generated  Current RentFeeder Status: Patient Declined (This is the date your Winning[employer]vant[employer] access code will )    4. Enter the last four digits of your Social Security Number (xxxx) and Date of Birth (mm/dd/yyyy) as indicated and click Submit. You will be taken to the next sign-up page. 5. Create a RentFeeder ID. This will be your [employer]Feeder login ID and cannot be changed, so think of one that is secure and easy to remember. 6. Create a [employer]eder password. You can change your password at any time. 7. Enter your Password Reset Question and Answer. This can be used at a later time if you forget your password. 8. Enter your e-mail address. You will receive e-mail notification when new information is available in [address] 9. Click Sign Up. You can now view and download portions of your medical record. 10. Click the Download Summary menu link to download a portable copy of your medical information. Additional Information    If you have questions, please visit the Frequently Asked Questions section of the [employer]Feeder website at [email or web address]/. Remember, [employer]Feeder is NOT to be used for urgent needs.  For medical emergencies, dial 911.    ______________________________________________________________________________________________________________________________

## 2017-12-24 NOTE — DISCHARGE SUMMARY
Total Joint Discharge Summary      Patient ID:  Maurisio Hermosillo  [id number]  13 y.o.  1960    Admit date: 12/22/2017    Discharge date and time: 12/24/2017     Admitting Physician: Jolene Page MD     Discharge Physician: Neftaly Santillan    Admission Diagnoses: Post-traumatic osteoarthritis of left knee [M17.32]    Discharge Diagnoses: Active Problems:    Knee osteoarthritis (12/22/2017)      Procedures: Left total knee arthroplasty    Surgeon: Neftaly Santillan    Preoperative Medical Clearance: Dr. Jacquelyn Elkins                          Perioperative Antibiotics: Vancomycin    Postoperative Pain Management:  Exparel, oxycodone, tylenol, celebrex    DVT Prophylaxis:  xarelto 10mg daily x 21 days    Postoperative transfusions:     none    Post Op complications: none apaprent    Hemoglobin at discharge: _10.6_    Discharge Exam    Temp: 98 °F (36.7 °C) (12/24/17 1211) Pulse (Heart Rate): 98 (12/24/17 1211) Resp Rate: 18 (12/24/17 1211) BP: 140/75 (12/24/17 1211) O2 Sat (%): 95 % (12/24/17 1211) Weight: 85.8 kg (189 lb 3.2 oz) (12/22/17 0915)       Wound appears to be healing without any evidence of infection. Knee dressings C/D/I  Intact EHL, FHL, GS, TA motor function  L3-S1 sensation intact  Calf soft, nontender  DP pulse palpable    Physical Therapy started on the day following surgery and progressed to independent ambulation with the aid of a walker. At the time of discharge, able to go up and down stairs and had understanding of precautions needed following surgery.       PT at time of DC: _90_degrees    Discharged to: Home with home health    Discharge instructions:  -Anticoagulate with: xarelto x 21 days  -Resume pre hospital diet            -Resume home medications per medical continuation form     -Ambulate with walker, appropriate total joint protocol  -Follow up in office as scheduled       Signed:  Jolene Page MD  12/24/2017  12:58 PM

## 2017-12-24 NOTE — ROUTINE PROCESS
9317: Stable. Voiding well and observed to be well rested. Hemovac drain continues to work well. Polar ice in place and pain management continues. No nausea or vomiting. Wound dressing CDI. Will continue to monitor the pt.

## 2017-12-24 NOTE — ROUTINE PROCESS
Discharge instructions given to patient. He verbalized understanding. No distress noted. Extra 6 inch ace wrap drsg given to patient.

## 2017-12-25 NOTE — ROUTINE PROCESS
Drsg. Sirisha Segundoer to previous drain site; removed 20min ago. Serosanguinous drainage noted on old drsg. No active bleeding noted. Tegaderm drsg. Applied. . No neurovascular deficits noted.

## 2017-12-28 ENCOUNTER — HOME HEALTH ADMISSION (OUTPATIENT)
Dept: HOME HEALTH SERVICES | Facility: HOME HEALTH | Age: 57
End: 2017-12-28
Payer: COMMERCIAL

## 2017-12-28 NOTE — PROGRESS NOTES
Received call from Dr Claus Stoner. He informs me that pt has not had [facility] services nor did he receive his walker. Pt was D/C'd on 12/24. Spoke with Harpreet Lay in DME who will resubmitt walker order and have it delivered to pt's home today. Spoke with Sayra Washburn at [facility] who informs me that pt's insurance required an [facility] supervisor to approve services and the process was delayed due to the holidays. Referral submitted to ROSALINDA DUDLEY and SHANE Ann made aware. She confirmed via her supervisor that they will be able to provide services beginning tomorrow. Left message on pt's cell phone to inform him of all above information.

## 2017-12-29 ENCOUNTER — HOME CARE VISIT (OUTPATIENT)
Dept: SCHEDULING | Facility: HOME HEALTH | Age: 57
End: 2017-12-29
Payer: COMMERCIAL

## 2017-12-29 VITALS
HEART RATE: 75 BPM | RESPIRATION RATE: 16 BRPM | DIASTOLIC BLOOD PRESSURE: 80 MMHG | SYSTOLIC BLOOD PRESSURE: 120 MMHG | TEMPERATURE: 98.1 F

## 2017-12-29 PROCEDURE — G0151 HHCP-SERV OF PT,EA 15 MIN: HCPCS

## 2017-12-29 PROCEDURE — 400013 HH SOC

## 2017-12-29 PROCEDURE — A6255 ABSORPT DRG >16<=48 IN W/BDR: HCPCS

## 2017-12-30 ENCOUNTER — HOME CARE VISIT (OUTPATIENT)
Dept: SCHEDULING | Facility: HOME HEALTH | Age: 57
End: 2017-12-30
Payer: COMMERCIAL

## 2017-12-30 PROCEDURE — G0157 HHC PT ASSISTANT EA 15: HCPCS

## 2017-12-31 ENCOUNTER — HOME CARE VISIT (OUTPATIENT)
Dept: SCHEDULING | Facility: HOME HEALTH | Age: 57
End: 2017-12-31
Payer: COMMERCIAL

## 2017-12-31 PROCEDURE — G0157 HHC PT ASSISTANT EA 15: HCPCS

## 2018-01-01 ENCOUNTER — HOME CARE VISIT (OUTPATIENT)
Dept: SCHEDULING | Facility: HOME HEALTH | Age: 58
End: 2018-01-01
Payer: COMMERCIAL

## 2018-01-01 ENCOUNTER — HOME CARE VISIT (OUTPATIENT)
Dept: HOME HEALTH SERVICES | Facility: HOME HEALTH | Age: 58
End: 2018-01-01
Payer: COMMERCIAL

## 2018-01-01 PROCEDURE — G0157 HHC PT ASSISTANT EA 15: HCPCS

## 2018-01-02 ENCOUNTER — HOME CARE VISIT (OUTPATIENT)
Dept: SCHEDULING | Facility: HOME HEALTH | Age: 58
End: 2018-01-02
Payer: COMMERCIAL

## 2018-01-02 VITALS — OXYGEN SATURATION: 97 % | DIASTOLIC BLOOD PRESSURE: 80 MMHG | SYSTOLIC BLOOD PRESSURE: 110 MMHG | HEART RATE: 85 BPM

## 2018-01-02 VITALS
TEMPERATURE: 98.5 F | OXYGEN SATURATION: 96 % | DIASTOLIC BLOOD PRESSURE: 80 MMHG | SYSTOLIC BLOOD PRESSURE: 124 MMHG | HEART RATE: 93 BPM

## 2018-01-02 VITALS
OXYGEN SATURATION: 97 % | SYSTOLIC BLOOD PRESSURE: 136 MMHG | DIASTOLIC BLOOD PRESSURE: 86 MMHG | TEMPERATURE: 98.6 F | OXYGEN SATURATION: 96 % | TEMPERATURE: 98.8 F | SYSTOLIC BLOOD PRESSURE: 130 MMHG | HEART RATE: 86 BPM | HEART RATE: 85 BPM | DIASTOLIC BLOOD PRESSURE: 80 MMHG

## 2018-01-02 PROCEDURE — G0157 HHC PT ASSISTANT EA 15: HCPCS

## 2018-01-03 ENCOUNTER — HOME CARE VISIT (OUTPATIENT)
Dept: SCHEDULING | Facility: HOME HEALTH | Age: 58
End: 2018-01-03
Payer: COMMERCIAL

## 2018-01-03 PROCEDURE — G0157 HHC PT ASSISTANT EA 15: HCPCS

## 2018-01-04 ENCOUNTER — HOME CARE VISIT (OUTPATIENT)
Dept: HOME HEALTH SERVICES | Facility: HOME HEALTH | Age: 58
End: 2018-01-04
Payer: COMMERCIAL

## 2018-01-05 ENCOUNTER — HOME CARE VISIT (OUTPATIENT)
Dept: HOME HEALTH SERVICES | Facility: HOME HEALTH | Age: 58
End: 2018-01-05
Payer: COMMERCIAL

## 2018-01-05 ENCOUNTER — HOME CARE VISIT (OUTPATIENT)
Dept: SCHEDULING | Facility: HOME HEALTH | Age: 58
End: 2018-01-05
Payer: COMMERCIAL

## 2018-01-05 VITALS — OXYGEN SATURATION: 96 % | DIASTOLIC BLOOD PRESSURE: 72 MMHG | SYSTOLIC BLOOD PRESSURE: 120 MMHG | HEART RATE: 80 BPM

## 2018-01-05 PROCEDURE — G0157 HHC PT ASSISTANT EA 15: HCPCS

## 2018-01-06 ENCOUNTER — HOME CARE VISIT (OUTPATIENT)
Dept: SCHEDULING | Facility: HOME HEALTH | Age: 58
End: 2018-01-06
Payer: COMMERCIAL

## 2018-01-06 VITALS — OXYGEN SATURATION: 98 % | SYSTOLIC BLOOD PRESSURE: 122 MMHG | DIASTOLIC BLOOD PRESSURE: 78 MMHG | HEART RATE: 80 BPM

## 2018-01-06 PROCEDURE — G0157 HHC PT ASSISTANT EA 15: HCPCS

## 2018-01-07 ENCOUNTER — HOME CARE VISIT (OUTPATIENT)
Dept: SCHEDULING | Facility: HOME HEALTH | Age: 58
End: 2018-01-07
Payer: COMMERCIAL

## 2018-01-07 VITALS
SYSTOLIC BLOOD PRESSURE: 124 MMHG | DIASTOLIC BLOOD PRESSURE: 76 MMHG | TEMPERATURE: 98 F | HEART RATE: 82 BPM | OXYGEN SATURATION: 99 %

## 2018-01-07 VITALS
SYSTOLIC BLOOD PRESSURE: 142 MMHG | HEART RATE: 83 BPM | DIASTOLIC BLOOD PRESSURE: 78 MMHG | OXYGEN SATURATION: 98 % | TEMPERATURE: 97.8 F

## 2018-01-07 PROCEDURE — G0157 HHC PT ASSISTANT EA 15: HCPCS

## 2018-01-08 ENCOUNTER — HOME CARE VISIT (OUTPATIENT)
Dept: SCHEDULING | Facility: HOME HEALTH | Age: 58
End: 2018-01-08
Payer: COMMERCIAL

## 2018-01-08 VITALS — DIASTOLIC BLOOD PRESSURE: 78 MMHG | OXYGEN SATURATION: 98 % | HEART RATE: 80 BPM | SYSTOLIC BLOOD PRESSURE: 122 MMHG

## 2018-01-08 PROCEDURE — G0157 HHC PT ASSISTANT EA 15: HCPCS

## 2018-01-09 ENCOUNTER — HOME CARE VISIT (OUTPATIENT)
Dept: SCHEDULING | Facility: HOME HEALTH | Age: 58
End: 2018-01-09
Payer: COMMERCIAL

## 2018-01-09 PROCEDURE — G0151 HHCP-SERV OF PT,EA 15 MIN: HCPCS

## 2018-01-11 VITALS — TEMPERATURE: 97.9 F | HEART RATE: 80 BPM | DIASTOLIC BLOOD PRESSURE: 70 MMHG | SYSTOLIC BLOOD PRESSURE: 140 MMHG

## 2018-04-12 ENCOUNTER — OFFICE VISIT (OUTPATIENT)
Dept: FAMILY MEDICINE CLINIC | Age: 58
End: 2018-04-12

## 2018-04-12 VITALS
HEIGHT: 70 IN | RESPIRATION RATE: 16 BRPM | TEMPERATURE: 98 F | DIASTOLIC BLOOD PRESSURE: 80 MMHG | OXYGEN SATURATION: 98 % | HEART RATE: 71 BPM | SYSTOLIC BLOOD PRESSURE: 126 MMHG | WEIGHT: 196 LBS | BODY MASS INDEX: 28.06 KG/M2

## 2018-04-12 DIAGNOSIS — E78.00 HYPERCHOLESTEROLEMIA: Primary | ICD-10-CM

## 2018-04-12 DIAGNOSIS — F98.8 ATTENTION DEFICIT DISORDER, UNSPECIFIED HYPERACTIVITY PRESENCE: ICD-10-CM

## 2018-04-12 DIAGNOSIS — N52.9 ERECTILE DYSFUNCTION, UNSPECIFIED ERECTILE DYSFUNCTION TYPE: ICD-10-CM

## 2018-04-12 DIAGNOSIS — F41.9 ANXIETY: ICD-10-CM

## 2018-04-12 DIAGNOSIS — Z12.5 PROSTATE CANCER SCREENING: ICD-10-CM

## 2018-04-12 RX ORDER — FLAXSEED OIL 1000 MG
1000 CAPSULE ORAL DAILY
COMMUNITY
End: 2018-08-02

## 2018-04-12 RX ORDER — BUPROPION HYDROCHLORIDE 150 MG/1
TABLET, EXTENDED RELEASE ORAL 2 TIMES DAILY
COMMUNITY
End: 2018-08-30

## 2018-04-12 RX ORDER — ROSUVASTATIN CALCIUM 20 MG/1
20 TABLET, COATED ORAL DAILY
COMMUNITY
End: 2018-04-12 | Stop reason: SDUPTHER

## 2018-04-12 NOTE — PROGRESS NOTES
I have scanned the prior PCPs notes that explain what happened at that visit. This was not a factor with how we treated this patient but it does reinforce that we have a similar approach and prefer psychiatry to manage anxiety medications should they be of a controlled class.

## 2018-04-12 NOTE — PATIENT INSTRUCTIONS
[facility]  [address] [facility], [address]  [place]

## 2018-04-12 NOTE — PROGRESS NOTES
1. Have you been to the ER, urgent care clinic since your last visit? Hospitalized since your last visit? No    2. Have you seen or consulted any other health care providers outside of the [address] [place] [address] since your last visit? Include any pap smears or colon screening.  Yes When: 04- Where: Dr. Jenny Cao (Ortho) Reason for visit: follow left knee replacement/ knee pain     Last Tdap:   Last Colonoscopy: 2011 Dr. Toby Coleman

## 2018-04-12 NOTE — MR AVS SNAPSHOT
[facility] 
[phone] Patient: Bowen Loera MRN: [id number] PTY:[birth year] Visit Information Date & Time Provider Department Dept. Phone Encounter #  
 4/12/2018  2:00 PM Ai Brown, [address] [place] [address] Road [id number] Follow-up Instructions Return in about 6 months (around 10/12/2018) for routine care. Fasting labs due 3-7 days prior to appointment . Upcoming Health Maintenance Date Due Hepatitis C Screening 1960 COLONOSCOPY 5/4/1976 DTaP/Tdap/Td series (1 - Tdap) 5/4/1979 Influenza Age 5 to Adult 8/1/2017 ZOSTER VACCINE AGE 60> 3/4/2018 Allergies as of 4/12/2018  Review Complete On: 4/12/2018 By: Ai Brown MD  
  
 Severity Noted Reaction Type Reactions Penicillins High 12/07/2016    Anaphylaxis Experienced seizures Sulfur  12/07/2016    Other (comments) FDE(fixed drug erruptions) Current Immunizations  Never Reviewed No immunizations on file. Not reviewed this visit You Were Diagnosed With   
  
 Codes Comments Hypercholesterolemia    -  Primary ICD-10-CM: E78.00 ICD-9-CM: 272.0 Anxiety     ICD-10-CM: F41.9 ICD-9-CM: 300.00 Body mass index (BMI) of 25.0 to 29.9     ICD-10-CM: E66.3 ICD-9-CM: 278.02 Attention deficit disorder, unspecified hyperactivity presence     ICD-10-CM: F98.8 ICD-9-CM: 314.00 Erectile dysfunction, unspecified erectile dysfunction type     ICD-10-CM: N52.9 ICD-9-CM: 607.84 Prostate cancer screening     ICD-10-CM: Z12.5 ICD-9-CM: V76.44 Vitals BP Pulse Temp Resp Height(growth percentile) Weight(growth percentile) 126/80 (BP 1 Location: Right arm, BP Patient Position: Sitting) 71 98 °F (36.7 °C) (Oral) 16 5' 10\" (1.778 m) 196 lb (88.9 kg) SpO2 BMI Smoking Status 98% 28.12 kg/m2 Never Smoker Vitals History BMI and BSA Data Body Mass Index Body Surface Area  
 28.12 kg/m 2 2.1 m 2 Preferred Pharmacy Pharmacy Name Phone [address], [address] [facility] [address] [facility] [phone] Your Updated Medication List  
  
   
This list is accurate as of 4/12/18  2:56 PM.  Always use your most recent med list.  
  
  
  
  
 CIALIS 5 mg tablet Generic drug:  tadalafil  
  
 clonazePAM 0.5 mg disintegrating tablet Commonly known as:  Ravi Gibes Take 0.5 mg by mouth two (2) times a day. CRESTOR 20 mg tablet Generic drug:  rosuvastatin Take 20 mg by mouth nightly. flaxseed oil 1,000 mg Cap Take 1,000 mg by mouth daily. multivitamins-minerals-lutein Tab tablet Commonly known as:  MEN'S CENTRUM SILVER WITH LUTEIN Take 1 Tab by Mouth Once a Day. WELLBUTRIN  mg SR tablet Generic drug:  buPROPion SR Take  by mouth two (2) times a day. We Performed the Following REFERRAL TO PSYCHIATRY [REF91 Custom] Comments:  
 Please evaluate patient for anxiety Follow-up Instructions Return in about 6 months (around 10/12/2018) for routine care. Fasting labs due 3-7 days prior to appointment . To-Do List   
 04/12/2018 Lab:  CBC W/O DIFF   
  
 04/12/2018 Lab:  LIPID PANEL   
  
 04/12/2018 Lab:  METABOLIC PANEL, COMPREHENSIVE   
  
 04/12/2018 Lab:  PSA, DIAGNOSTIC (PROSTATE SPECIFIC AG) Referral Information Referral ID Referred By Referred To  
  
 [id number] Gaurav Briggs Not Available Visits Status Start Date End Date 1 New Request 4/12/18 4/12/19 If your referral has a status of pending review or denied, additional information will be sent to support the outcome of this decision. Patient Instructions [facility] [address] [place], [address] [place] [address],  [address] [phone] Please provide this summary of care documentation to your next provider. Your primary care clinician is listed as Shaun Dwyer. If you have any questions after today's visit, please call [phone].

## 2018-06-07 ENCOUNTER — IMPORTED ENCOUNTER (OUTPATIENT)
Dept: URBAN - METROPOLITAN AREA CLINIC 1 | Facility: CLINIC | Age: 58
End: 2018-06-07

## 2018-06-07 PROBLEM — H47.231: Noted: 2018-06-07

## 2018-06-07 PROBLEM — H40.013: Noted: 2018-06-07

## 2018-06-07 PROBLEM — H25.13: Noted: 2018-06-07

## 2018-06-07 PROBLEM — H43.812: Noted: 2018-06-07

## 2018-06-07 PROCEDURE — 92002 INTRM OPH EXAM NEW PATIENT: CPT

## 2018-06-07 PROCEDURE — 92015 DETERMINE REFRACTIVE STATE: CPT

## 2018-06-07 PROCEDURE — 92250 FUNDUS PHOTOGRAPHY W/I&R: CPT

## 2018-06-07 NOTE — PATIENT DISCUSSION
1.  PVD w/o Tear OS: RD precautions. Condition discussed w/ pt and reassurance given. Discussed possibility of Vitrectomy w / a retina specialists if symptoms intolerable. 2. Glaucoma Suspect OU w/ Optic Atrophy OS (0.6 OU): Normal IOP OU. Baseline disc photos taken today. Further baseline OCT/HVf to be done at next appt. Patient is considered Low Risk. Condition was discussed with patient and patient understands. Will continue to monitor patient for any progression in condition. Patient was advised to call us with any problems questions or concerns. 3.  Cataract OU: Observe for now without intervention. The patient was advised to contact us if any change or worsening of vision. 4. Return for an appointment for a 10/OCT/HVF within 3 months with Dr. Simon Painter.

## 2018-08-03 PROBLEM — N40.1 BENIGN PROSTATIC HYPERPLASIA WITH WEAK URINARY STREAM: Status: ACTIVE | Noted: 2018-08-03

## 2018-08-03 PROBLEM — R39.12 BENIGN PROSTATIC HYPERPLASIA WITH WEAK URINARY STREAM: Status: ACTIVE | Noted: 2018-08-03

## 2022-04-02 ASSESSMENT — TONOMETRY
OD_IOP_MMHG: 11
OS_IOP_MMHG: 11

## 2022-04-02 ASSESSMENT — VISUAL ACUITY
OS_CC: 20/25
OD_CC: 20/30

## (undated) DEVICE — TRAY CATH OD16FR SIL URIN M STATLOK STBL DEV SURSTP

## (undated) DEVICE — PADDING CAST W6INXL4YD ST COT COHESIVE HND TEARABLE SPEC

## (undated) DEVICE — HANDPIECE SET WITH HIGH FLOW TIP AND SUCTION TUBE: Brand: INTERPULSE

## (undated) DEVICE — SUTURE MCRYL SZ 4-0 L27IN ABSRB UD L24MM PS-1 3/8 CIR PRIM Y935H

## (undated) DEVICE — NEEDLE NRV BLK 21GA L4IN 30DEG INSUL BVL EXTN SET STIMUPLEX

## (undated) DEVICE — PAD COOL W10.9XL11.3IN UNIV REG HOSE WRP ON THER CLD

## (undated) DEVICE — Z DISCONTINUED BY MEDLINE USE 2711682 TRAY SKIN PREP DRY W/ PREM GLV

## (undated) DEVICE — SUTURE VCRL SZ 0 L36IN ABSRB UD L36MM CT-1 1/2 CIR J946H

## (undated) DEVICE — PIN DRL QUIK HI PERF FOR SIG SYS

## (undated) DEVICE — BOWL AND CEMENT CARTRIDGE WITH BREAKAWAY FEMORAL NOZZLE: Brand: ACM

## (undated) DEVICE — KENDALL SCD EXPRESS SLEEVES, KNEE LENGTH, MEDIUM: Brand: KENDALL SCD

## (undated) DEVICE — STOCKING COMPR XL L16-18IN LNG 19MMHG ANK 10-11IN CALF

## (undated) DEVICE — GUIDEPIN ORTH THRD HI PERF HD SIG

## (undated) DEVICE — T4 HOOD

## (undated) DEVICE — ZIMMER® STERILE DISPOSABLE TOURNIQUET CUFF WITH PROTECTIVE SLEEVE AND PLC, DUAL PORT, SINGLE BLADDER, 34 IN. (86 CM)

## (undated) DEVICE — SOFT SILICONE HYDROCELLULAR SACRUM DRESSING WITH LOCK AWAY LAYER: Brand: ALLEVYN LIFE SACRUM (LARGE) PACK OF 10

## (undated) DEVICE — (D)PREP SKN CHLRAPRP APPL 26ML -- CONVERT TO ITEM 371833

## (undated) DEVICE — SUTURE ETHBND EXCEL SZ 0 L18IN NONABSORBABLE GRN L36MM CT-1 CX21D

## (undated) DEVICE — BLADE RMFG DBL RECIP DBL SD --

## (undated) DEVICE — NEEDLE HYPO 18GA L1.5IN PNK S STL HUB POLYPR SHLD REG BVL

## (undated) DEVICE — 3M™ BAIR PAWS FLEX™ WARMING GOWN, STANDARD, 20 PER CASE 81003: Brand: BAIR PAWS™

## (undated) DEVICE — Device

## (undated) DEVICE — STERILE POLYISOPRENE POWDER-FREE SURGICAL GLOVES: Brand: PROTEXIS

## (undated) DEVICE — REM POLYHESIVE ADULT PATIENT RETURN ELECTRODE: Brand: VALLEYLAB

## (undated) DEVICE — SYR LR LCK 1ML GRAD NSAF 30ML --

## (undated) DEVICE — ELASTIC BANDAGE 6": Brand: CARDINAL HEALTH

## (undated) DEVICE — UNIT THER SEMI CLS LOOP RECIRC SYS W/ UNIV WRP ON PD ICEMAN

## (undated) DEVICE — KIT CLN UP BON SECOURS MARYV

## (undated) DEVICE — GAUZE,SPONGE,4"X4",12PLY,STERILE,LF,2'S: Brand: MEDLINE

## (undated) DEVICE — PACK SURG BSHR TOT KNEE LF

## (undated) DEVICE — 4-PORT MANIFOLD: Brand: NEPTUNE 2

## (undated) DEVICE — GAUZE SPONGES,16 PLY: Brand: CURITY

## (undated) DEVICE — INTENDED FOR TISSUE SEPARATION, AND OTHER PROCEDURES THAT REQUIRE A SHARP SURGICAL BLADE TO PUNCTURE OR CUT.: Brand: BARD-PARKER SAFETY BLADES SIZE 10, STERILE

## (undated) DEVICE — 3M™ TEGADERM™ TRANSPARENT FILM DRESSING FRAME STYLE, 1626W, 4 IN X 4-3/4 IN (10 CM X 12 CM), 50/CT 4CT/CASE: Brand: 3M™ TEGADERM™

## (undated) DEVICE — 3M™ WARMING BLANKET, UPPER BODY, 10 PER CASE, 42268: Brand: BAIR HUGGER™

## (undated) DEVICE — GOWN,REINFORCED,POLY,AURORA,XLARGE,STRL: Brand: MEDLINE

## (undated) DEVICE — SOLUTION IV 1000ML 0.9% SOD CHL

## (undated) DEVICE — DRSG AQUACEL SURG 3.5 X 10IN -- CONVERT TO ITEM 370183

## (undated) DEVICE — SPONGE LAP 18X18IN STRL -- 5/PK

## (undated) DEVICE — DRAIN KT WND 10FR RND 400ML --

## (undated) DEVICE — SYSTEM SKIN CLSR 22CM DERMBND PRINEO

## (undated) DEVICE — SOLUTION IRRIG 3000ML 0.9% SOD CHL FLX CONT 0797208] ICU MEDICAL INC]

## (undated) DEVICE — RECIPROCATING BLADE, DOUBLE SIDED, OFFSET  (70.0 X 0.64 X 12.6MM)